# Patient Record
Sex: FEMALE | Employment: PART TIME | URBAN - METROPOLITAN AREA
[De-identification: names, ages, dates, MRNs, and addresses within clinical notes are randomized per-mention and may not be internally consistent; named-entity substitution may affect disease eponyms.]

---

## 2017-01-11 ENCOUNTER — HOSPITAL ENCOUNTER (OUTPATIENT)
Dept: GENERAL RADIOLOGY | Facility: CLINIC | Age: 22
Discharge: HOME | End: 2017-01-11
Attending: FAMILY MEDICINE | Admitting: FAMILY MEDICINE
Payer: COMMERCIAL

## 2017-01-11 ENCOUNTER — OFFICE VISIT (OUTPATIENT)
Dept: FAMILY MEDICINE | Facility: CLINIC | Age: 22
End: 2017-01-11
Payer: COMMERCIAL

## 2017-01-11 VITALS
OXYGEN SATURATION: 100 % | DIASTOLIC BLOOD PRESSURE: 76 MMHG | HEIGHT: 62 IN | WEIGHT: 189.75 LBS | BODY MASS INDEX: 34.92 KG/M2 | SYSTOLIC BLOOD PRESSURE: 124 MMHG | TEMPERATURE: 98.7 F | HEART RATE: 120 BPM

## 2017-01-11 DIAGNOSIS — Z23 NEED FOR VACCINATION AGAINST HUMAN PAPILLOMAVIRUS: ICD-10-CM

## 2017-01-11 DIAGNOSIS — Z13.6 CARDIOVASCULAR SCREENING; LDL GOAL LESS THAN 160: ICD-10-CM

## 2017-01-11 DIAGNOSIS — R11.15 INTRACTABLE CYCLICAL VOMITING WITH NAUSEA: ICD-10-CM

## 2017-01-11 DIAGNOSIS — Z30.41 ORAL CONTRACEPTIVE PILL SURVEILLANCE: ICD-10-CM

## 2017-01-11 DIAGNOSIS — R10.84 ABDOMINAL PAIN, GENERALIZED: ICD-10-CM

## 2017-01-11 DIAGNOSIS — Z00.01 ENCOUNTER FOR ROUTINE ADULT HEALTH EXAMINATION WITH ABNORMAL FINDINGS: Primary | ICD-10-CM

## 2017-01-11 DIAGNOSIS — Z12.4 SCREENING FOR MALIGNANT NEOPLASM OF CERVIX: ICD-10-CM

## 2017-01-11 LAB
CHOLEST SERPL-MCNC: 165 MG/DL
HDLC SERPL-MCNC: 47 MG/DL
LDLC SERPL CALC-MCNC: 97 MG/DL
NONHDLC SERPL-MCNC: 118 MG/DL
TRIGL SERPL-MCNC: 107 MG/DL
TSH SERPL DL<=0.005 MIU/L-ACNC: 2.02 MU/L (ref 0.4–4)

## 2017-01-11 PROCEDURE — 87491 CHLMYD TRACH DNA AMP PROBE: CPT | Performed by: FAMILY MEDICINE

## 2017-01-11 PROCEDURE — 87591 N.GONORRHOEAE DNA AMP PROB: CPT | Performed by: FAMILY MEDICINE

## 2017-01-11 PROCEDURE — 99395 PREV VISIT EST AGE 18-39: CPT | Mod: 25 | Performed by: FAMILY MEDICINE

## 2017-01-11 PROCEDURE — 90471 IMMUNIZATION ADMIN: CPT | Performed by: FAMILY MEDICINE

## 2017-01-11 PROCEDURE — 90649 4VHPV VACCINE 3 DOSE IM: CPT | Performed by: FAMILY MEDICINE

## 2017-01-11 PROCEDURE — 83516 IMMUNOASSAY NONANTIBODY: CPT | Performed by: FAMILY MEDICINE

## 2017-01-11 PROCEDURE — 84443 ASSAY THYROID STIM HORMONE: CPT | Performed by: FAMILY MEDICINE

## 2017-01-11 PROCEDURE — 80061 LIPID PANEL: CPT | Performed by: FAMILY MEDICINE

## 2017-01-11 PROCEDURE — 99213 OFFICE O/P EST LOW 20 MIN: CPT | Mod: 25 | Performed by: FAMILY MEDICINE

## 2017-01-11 PROCEDURE — G0145 SCR C/V CYTO,THINLAYER,RESCR: HCPCS | Performed by: FAMILY MEDICINE

## 2017-01-11 PROCEDURE — 36415 COLL VENOUS BLD VENIPUNCTURE: CPT | Performed by: FAMILY MEDICINE

## 2017-01-11 PROCEDURE — 74020 XR ABDOMEN 2 VW: CPT | Mod: TC

## 2017-01-11 RX ORDER — LEVONORGESTREL/ETHIN.ESTRADIOL 0.1-0.02MG
1 TABLET ORAL DAILY
Qty: 84 TABLET | Refills: 3 | Status: SHIPPED | OUTPATIENT
Start: 2017-01-11 | End: 2018-12-27

## 2017-01-11 ASSESSMENT — PAIN SCALES - GENERAL: PAINLEVEL: SEVERE PAIN (7)

## 2017-01-11 NOTE — NURSING NOTE
"Chief Complaint   Patient presents with     Abdominal Pain     Vomiting     After Eating     Gyn Exam       Initial /76 mmHg  Pulse 120  Temp(Src) 98.7  F (37.1  C) (Tympanic)  Ht 5' 2\" (1.575 m)  Wt 189 lb 12 oz (86.07 kg)  BMI 34.70 kg/m2  SpO2 100%  LMP 12/26/2016 (Approximate)  Breastfeeding? No Estimated body mass index is 34.7 kg/(m^2) as calculated from the following:    Height as of this encounter: 5' 2\" (1.575 m).    Weight as of this encounter: 189 lb 12 oz (86.07 kg).  BP completed using cuff size: quinton Gresham MA  "

## 2017-01-11 NOTE — Clinical Note
[address] [address]  [phone]      January 16, 2017    Susan Delarosa  [facility] [place] [address]          Dear Susan,    I am happy to inform you that your recent cervical cancer screening test (PAP smear) was normal.      Preventative screening such as this helps insure your health for years to come.  This test should be repeated in 3 years unless otherwise directed.    You will still need to return to the clinic every year for your annual exam and other preventive tests.    Please contact the clinic if you have further questions.      Sincerely,    Mercedes Jewell MD/mark

## 2017-01-11 NOTE — PROGRESS NOTES
SUBJECTIVE:                                                    S:  Susan Delarosa is a 21 year old female who presents to the clinic for a routine physical exam, but would also like to discuss vomiting and abdominal pain. Patient complains of nausea and emesis intermittently for about 1 year. She was seen in the ED in the summer of 2016 with nausea and vomiting, at which time she had completely normal labs and a negative Gallbladder U/S. She was given a GI cocktail at that time which she found helpful, she was discharged with a prescription for Omeprazole. Patient was seen in the clinic on 8/22/2016 complaining of persistent nausea and vomiting. She states at that time that her symptoms had somewhat improved with Omeprazole. I started her on Omeprazole daily and placed a referral for a GI specialist. She had a normal endoscopy on 8/31/2016. Today, patient states that she was just seen again on 1/10/17 in the [facility] ED with emesis for 5-6 days. She had normal labs including comprehensive panel, ESR and CBC, urine test including pregnancy test. She was given Phenergan with minimal relief. Patient was told to follow up with a GI specialist. She now complains of ongoing emesis for about a week, accompanied by nausea, abdominal pain, diarrhea, headaches, weakness, and lightheadedness. She says that she has been unable to eat anything in 5-6 days, and unable to hydrate with even clear liquids for 2-3 days. She states that she cannot keep anything down. Patient reports that she collapsed from weakness because of her emesis a couple days ago. She is curious if there is anything she can do for further evaluation.     Problem list and histories reviewed & adjusted, as indicated.  Additional history: as documented    Patient Active Problem List   Diagnosis     Intractable migraine with aura     Simple chronic serous otitis media     Acne vulgaris     Past Surgical History   Procedure Laterality Date     Hc  "create eardrum opening,gen anesth  1997     P.E. Tubes lt     Hc create eardrum opening,gen anesth  04/11/2005     Left myringotomy with PE tube placement.     Esophagoscopy, gastroscopy, duodenoscopy (egd), combined N/A 8/31/2016     Procedure: COMBINED ESOPHAGOSCOPY, GASTROSCOPY, DUODENOSCOPY (EGD), BIOPSY SINGLE OR MULTIPLE;  Surgeon: Jacob Gonzales MD;  Location:  GI       Social History   Substance Use Topics     Smoking status: Never Smoker      Smokeless tobacco: Never Used     Alcohol Use: No     Family History   Problem Relation Age of Onset     DIABETES Maternal Grandmother      DIABETES Maternal Grandfather      Neurologic Disorder Father      Migraines     Neurologic Disorder Paternal Grandmother      Migraines     CANCER No family hx of      HEART DISEASE No family hx of          Current Outpatient Prescriptions   Medication Sig Dispense Refill     levonorgestrel-ethinyl estradiol (AVIANE,ALESSE,LESSINA) 0.1-20 MG-MCG per tablet Take 1 tablet by mouth daily 84 tablet 3     [DISCONTINUED] levonorgestrel-ethinyl estradiol (AVIANE,ALESSE,LESSINA) 0.1-20 MG-MCG per tablet Take 1 tablet by mouth daily       Allergies   Allergen Reactions     Latex Hives     band-aids     ROS:  All other ROS reviewed and are negative or non-contributory except as stated in HPI or separate note.     OBJECTIVE:                                                    /76 mmHg  Pulse 120  Temp(Src) 98.7  F (37.1  C) (Tympanic)  Ht 5' 2\" (1.575 m)  Wt 189 lb 12 oz (86.07 kg)  BMI 34.70 kg/m2  SpO2 100%  LMP 12/26/2016 (Approximate)  Breastfeeding? No  Body mass index is 34.7 kg/(m^2).   .HEENT:  Normocephalic, non-tender. Eyes PERRLA, EOMI.  Ears TM's are pearly.  Nose patent. Throat without erythema.         NECK:  Supple with no adenopathy or thyromegaly.  No bruits.       LUNGS:  Clear to auscultation.     HEART:  Regular rhythm and rate, normal S1, S2, without murmur, rub, click, or gallop.     ABDOMEN:  Bowel " sounds are present throughout.  The abdomen is soft without hepatosplenomegaly or masses. Abdomen is extremely tender to touch throughout, with voluntary guarding.  Dark, course hair noted on lower abdomen.     NEURO:  CNII - XII grossly intact and Strength and sensation grossly intact     SKIN: Brown, patchy, macular hyperpigmentation on right crease of neck.     MSK:  FROM upper and lower extremities.      BREAST EXAM: No skin changes.  No nipple retraction or discharge.  No adenopathy palpated.  No distinct masses or nodules are palpable.    PELVIC EXAM:  EG negative for lesion or foreign body.  Narrow introitus, able to complete exam with narrow speculum.  Vaginal mucosa is moist and pink without discharge.  Cervix is normal without lesions.   Pap smear and and Gen Probe obtained without incident.  Bimanual exam reveals: firm, nontender, nongravid uterus without CMT.  Adenexa are mobile and non-tender bilaterally.    Rectovaginal exam deferred.      Diagnostic Test Results:    Orders Placed This Encounter   Procedures     XR Abdomen 2 Views     TSH with free T4 reflex     Tissue transglutaminase nick IgA and IgG        ASSESSMENT:                                                      Abdominal pain, generalized  Intractable cyclical vomiting with nausea    PLAN:                                                      Patient is afebrile with normal vitals upon presentation to the clinic. On exam, she is exquisitely tender with guarding in general throughout the abdomen. She had several normal labs and imaging when having her symptoms evaluated previously, as above. Discussed that her weight is stable, and in fact, is going up, so I am less concerned about serious illness or dehydration, although by her description she sound dehydrated or at least at risk of becoming so. Abdominal X-ray ordered, which shows a normal stool and gas pattern, and is otherwise normal without obstruction or free air. Labs drawn as well, TSH  is normal. She requested this due to difficulty losing weight. She also requested to be tested for gluten allergy, so I agreed to draw an tissue Transglutaminase ab.  Will notify with results and discuss further evaluation if needed at that time. Will also schedule an abdominal U/S and notify with results. If U/S is normal, I will refer her to a GI specialist to consider another endoscopy or GI motility study. Encouraged her to rest and stay well hydrated. She should follow a clear liquid diet in the mean time, which we discussed at length. Patient is in agreement with this treatment plan and stable. Follow up to discuss results, or sooner with difficulty staying hydrated, high fever, or other worsening symptoms.     This document serves as a record of services personally performed by Mercedes Jewell MD. It was created on their behalf by Yokasta Parra, a trained medical scribe. The creation of this record is based on the provider's personal observations and the statements of the patient. This document has been checked and approved by the attending provider.    Mercedes Jewell MD  [facility]

## 2017-01-11 NOTE — PROGRESS NOTES
SUBJECTIVE:     CC: Susan Delarosa is an 21 year old woman who presents for preventive health visit.     Last pap: No results found for this basename: pap, never had one yet  Last Tetanus:  6/8/2016  Last Dexa N/A   Last Colonoscopy N/A  Do you take a Calcium supplement No  Do you exercise Yes  Do you wear a seatbelt Yes  Last Mammogram N/A  Do you perform your own monthly self breast exam?  No  Do you use sunscreen? Yes  Periods- Regular - using OCP's and condoms.     Healthy Habits:    Do you get at least three servings of calcium containing foods daily (dairy, green leafy vegetables, etc.)? yes    Problems taking medications regularly No    Medication side effects: No    Have you had an eye exam in the past two years? yes    Do you see a dentist twice per year? yes      Other concerns to address:  See separate note re: abdominal pain and vomiting.     Today's PHQ-2 Score:      Abuse: Current or Past(Physical, Sexual or Emotional)- No  Do you feel safe in your environment - Yes    Social History   Substance Use Topics     Smoking status: Never Smoker      Smokeless tobacco: Never Used     Alcohol Use: No     The patient does not drink >3 drinks per day nor >7 drinks per week.    Reviewed orders with patient.  Reviewed health maintenance and updated orders accordingly - Yes    History of abnormal Pap smear: NO - age 21-29 PAP every 3 years recommended  All Histories reviewed and updated in Epic.    Patient Active Problem List    Diagnosis Date Noted     Acne vulgaris 04/11/2012     Simple chronic serous otitis media 01/14/2005     Problem list name updated by automated process. Provider to review       Intractable migraine with aura 12/17/2004     Problem list name updated by automated process. Provider to review          Past Medical History   Diagnosis Date     Headache(784.0)      Unspecified chronic suppurative otitis media      Recurrent left otitis     Acne         Past Surgical History   Procedure  "Laterality Date     Hc create eardrum opening,gen anesth  1997     P.E. Tubes lt     Hc create eardrum opening,gen anesth  04/11/2005     Left myringotomy with PE tube placement.     Esophagoscopy, gastroscopy, duodenoscopy (egd), combined N/A 8/31/2016     Procedure: COMBINED ESOPHAGOSCOPY, GASTROSCOPY, DUODENOSCOPY (EGD), BIOPSY SINGLE OR MULTIPLE;  Surgeon: Jacob Gonzales MD;  Location:  GI        Family History   Problem Relation Age of Onset     DIABETES Maternal Grandmother      DIABETES Maternal Grandfather      Neurologic Disorder Father      Migraines     Neurologic Disorder Paternal Grandmother      Migraines     CANCER No family hx of      HEART DISEASE No family hx of           reports that she currently engages in sexual activity and has had male partners. She reports using the following methods of birth control/protection: Pill and Condom.     REVIEW OF SYSTEMS:   Ears/Nose/Throat: negative  Dental exam: negative    Eyes: negative.  Last eye exam: UTD.  Correction: glasses/contacts  Respiratory: negative  Cardiovascular: negative  Gastrointestinal: Positive for abdominal pain, emesis, vomiting, see separate note.   Genitourinary: negative  Musculoskeletal: negative    Neurologic: negative   Skin: negative   Psychiatric: negative   Hematologic/Lymphatic/Immunologic:  negative    Endocrine:  negative   GYN: Periods as above.  No concerns for STD's. 1 partner.        OBJECTIVE:       /76 mmHg  Pulse 120  Temp(Src) 98.7  F (37.1  C) (Tympanic)  Ht 5' 2\" (1.575 m)  Wt 189 lb 12 oz (86.07 kg)  BMI 34.70 kg/m2  SpO2 100%  LMP 12/26/2016 (Approximate)  Breastfeeding? No    HEENT:  Normocephalic, non-tender. E yes PERRLA, EOMI.  Ears TM's are pearly.  Nose patent. Throat without erythema.         NECK:  Supple with no adenopathy or thyromegaly.  No bruits.       LUNGS:  Clear to auscultation.     HEART:  Regular rhythm and rate, normal S1, S2, without murmur, rub, click, or gallop. "     ABDOMEN:  Bowel sounds are present throughout. The abdomen is soft without hepatosplenomegaly or masses. Abdomen is extremely tender to touch throughout, with voluntary guarding. Dark, course hair noted on lower abdomen.    NEURO:  CNII - XII grossly intact and Strength and sensation grossly intact     SKIN: Brown, patchy, macular hyperpigmentation on right crease of neck.     MSK:  FROM upper and lower extremities.      BREAST EXAM: No skin changes.  No nipple retraction or discharge.  No adenopathy palpated.  No distinct masses or nodules are palpable.    PELVIC EXAM:  EG negative for lesion or foreign body.  Narrow introitus allows narrow speculum.  Vaginal mucosa is moist and pink without discharge.  Cervix is normal without lesions.   Pap smear and and Gen Probe obtained without incident.  Bimanual exam reveals: firm, nontender, nongravid uterus without CMT.  Adenexa are mobile and non-tender bilaterally.    Rectovaginal exam deferred.      ATP III Guidelines  FRAX Risk Assessment  ICSI Preventive Guidelines    ASSESSMENT/PLAN:         ICD-10-CM    1. Encounter for routine adult health examination with abnormal findings Z00.01 Chlamydia trachomatis PCR     Neisseria gonorrhoeae PCR   2. Screening for malignant neoplasm of cervix Z12.4 Pap imaged thin layer screen only - recommended age 21 - 24 years   3. Need for vaccination against human papillomavirus Z23 HUMAN PAPILLOMA VIRUS VACCINE   4. Abdominal pain, generalized R10.84 XR Abdomen 2 Views     TSH with free T4 reflex     Tissue transglutaminase nick IgA and IgG   5. Intractable cyclical vomiting with nausea G43.A1 XR Abdomen 2 Views     TSH with free T4 reflex     Tissue transglutaminase nick IgA and IgG   6. Oral contraceptive pill surveillance Z30.41 levonorgestrel-ethinyl estradiol (AVIANE,ALESSE,LESSINA) 0.1-20 MG-MCG per tablet   7. CARDIOVASCULAR SCREENING; LDL GOAL LESS THAN 160 Z13.6 Lipid Profile with reflex to direct LDL       PLAN:  -  Recommend yearly physical with pap every 3 years - PAP and Gen Probe collected, will notify with results and discuss further evaluation/ treatment if needed.   - Labs drawn as above, will notify with results and discuss further evaluation/ treatment if needed at that time.   - Discussed Nexaplanon and other forms of contraception, patient would like to continue with OCP's. Refilled contraceptives.   - Exercise encouraged minimum 30 minutes daily  - Monitor size and characteristics of moles, f/u with any changes  - HPV Vaccination #2 given.   - 3rd HEP B vaccination due after 2/2.   - See separate note for further plan.     COUNSELING:  Reviewed preventive health counseling, as reflected in patient instructions       Regular exercise       Healthy diet/nutrition       Vision screening       Hearing screening       Vaccinated for: Human Papillomavirus       Contraception   reports that she has never smoked. She has never used smokeless tobacco.    Body mass index is 34.7 kg/(m^2).  Weight management plan: Discussed healthy diet and exercise guidelines and patient will follow up in 12 months in clinic to re-evaluate.    This document serves as a record of services personally performed by Mercedes Jewell MD. It was created on their behalf by Yokasta Parra, a trained medical scribe. The creation of this record is based on the provider's personal observations and the statements of the patient. This document has been checked and approved by the attending provider.    Mercedes Jewell MD

## 2017-01-12 ENCOUNTER — TELEPHONE (OUTPATIENT)
Dept: FAMILY MEDICINE | Facility: CLINIC | Age: 22
End: 2017-01-12

## 2017-01-12 DIAGNOSIS — R10.84 ABDOMINAL PAIN, GENERALIZED: Primary | ICD-10-CM

## 2017-01-12 LAB
C TRACH DNA SPEC QL NAA+PROBE: NORMAL
N GONORRHOEA DNA SPEC QL NAA+PROBE: NORMAL
SPECIMEN SOURCE: NORMAL
SPECIMEN SOURCE: NORMAL

## 2017-01-12 NOTE — TELEPHONE ENCOUNTER
Susan wondering if she can get a work excuse note for her job.  Seen in clinic 1/11/17, symptoms continue.  She is unable to keep any thing down, anti nausea med she received in ED (Phenergan) is making nausea worse.  She is too weak to work, is requiring assistance with ADL's due to her weakness.  If possible, could a note be faxed to her employer ([employer]), to the employee occupational health dept.  Or, if she needs to, she can come to clinic to pick it up.    Thanks  Viviane Smalls RN  FNA

## 2017-01-12 NOTE — Clinical Note
[address] [address]  Phone: [phone]  Fax: [phone]    January 13, 2017        Susan Delarosa  [address] [place] [address] [address] [place] [address]          To whom it may concern:    RE: Susan Delarosa    Patient was seen and treated 1/11/17 at our clinic and missed work. Due to illness, I am excusing her from work through 1/17/17. She should be able to work without restrictions as of 1/20/17.      Please contact me for questions or concerns.      Sincerely,        Mercedes Jewell MD

## 2017-01-13 ENCOUNTER — TELEPHONE (OUTPATIENT)
Dept: FAMILY MEDICINE | Facility: CLINIC | Age: 22
End: 2017-01-13

## 2017-01-13 NOTE — TELEPHONE ENCOUNTER
"----- Message from Mercedes Jewell MD sent at 1/13/2017  3:29 PM CST -----  Notify Susan that her STD tests are normal, no infection. Her thyroid is good. Her cholesterol is good except she needs to raise her \"good\" HDL cholesterol through more exercise.   Mercedes Jewell MD  "

## 2017-01-13 NOTE — PROGRESS NOTES
"Quick Note:    Notify Susan that her STD tests are normal, no infection. Her thyroid is good. Her cholesterol is good except she needs to raise her \"good\" HDL cholesterol through more exercise.   Mercedes Jewell MD  ______  "

## 2017-01-13 NOTE — TELEPHONE ENCOUNTER
See letter.  Also please make sure she is getting set up for upper abdominal u/s. I'm still waiting on some of her labs, but want to proceed with upper abdominal u/s.   Mercedes Jewell MD

## 2017-01-13 NOTE — TELEPHONE ENCOUNTER
Susan is calling this morning wondering if she can get a note for work. Still very weak. Please call when available

## 2017-01-13 NOTE — TELEPHONE ENCOUNTER
Patient called in regarding the Ultrasound for her abdomen. I didn't see an order for an ultrasound. Please give patient a call at [phone]

## 2017-01-13 NOTE — TELEPHONE ENCOUNTER
Informed patient about letter being faxed did not remember to tell patient about the Ultrasound for her abdomen.    Need to call Monday re: that please.   Viviane POLANCO

## 2017-01-13 NOTE — TELEPHONE ENCOUNTER
Susan is calling back stating the dates to be excused are 01/16/17 and 01/17/17. Patient would like to return to work on 01/20/17. Please fax to [phone]

## 2017-01-13 NOTE — TELEPHONE ENCOUNTER
JASON on VM for patient to return call to clinic.  We need to know how long she wants to be excused from work (dates) and the fax number of where she wants it faxed to.  Melony Gresham MA

## 2017-01-14 LAB
COPATH REPORT: NORMAL
PAP: NORMAL

## 2017-01-17 LAB
TTG IGA SER-ACNC: 1 U/ML
TTG IGG SER-ACNC: NORMAL U/ML

## 2017-01-19 NOTE — TELEPHONE ENCOUNTER
Called patient and she is still having nausea and vomiting(after eating). Patient has not received Gluten lab results yet.  Jennifer PIRES MA

## 2017-01-19 NOTE — TELEPHONE ENCOUNTER
Patient would like a call back to update you on her condition, also states that she is missing some of the labs results that were done. Please call and advise #[phone] would like a call later today or early tomorrow

## 2017-01-20 NOTE — TELEPHONE ENCOUNTER
Pt called stating she is still having nausea and vomiting. Pt is wondering if she could speak with Dr. Jewell before the end of today. Please advise.    Thank you,   Brittany Laird   for [facility]

## 2017-01-20 NOTE — TELEPHONE ENCOUNTER
Patient is not able to work with this and needs note for work from 1/6 -?. Patient US is on thurs 1/26. Patient will trial gluten free diet. Informed patient that we could try to get US scheduled sooner and she stated she will keep appointment and try gluten free diet at this time.  Jennifer PIRES MA

## 2017-01-20 NOTE — TELEPHONE ENCOUNTER
Notify her that the test came back negative, she is NOT allergic to gluten.  She still may be sensitive to it, and still would likely benefit from a gluten free diet trial for the next 2 months. Also please find out if she is getting the upper abdominal us that I recommended.   Mercedes Jewell MD

## 2017-01-23 NOTE — TELEPHONE ENCOUNTER
JASON on VM for patient to call back with date she is wanting to go back,  who it should be written out to, and where she wants it sent.  Melony Gresham MA

## 2017-01-23 NOTE — TELEPHONE ENCOUNTER
When she calls back please gather the below information and set up a letter for Dr. Jewell to sign.  Thanks  Melony Gresham MA

## 2017-01-24 ENCOUNTER — TELEPHONE (OUTPATIENT)
Dept: FAMILY MEDICINE | Facility: CLINIC | Age: 22
End: 2017-01-24

## 2017-01-24 NOTE — TELEPHONE ENCOUNTER
Reason for Call:  Other     Detailed comments: Asking for a note for work stating how long she might be out for. She continues to have stomach symptoms and is nauseated before and after she eats. Informed her Dr. Jewell is out of office until 1/30 and this might need to wait for her. She is asking if another care team provider would be able to help her.     Phone Number Patient can be reached at: Cell number on file:     Telephone Information:    Mobile  [phone]        Best Time: any     Can we leave a detailed message on this number? YES    Call taken on 1/24/2017 at 3:03 PM by Ara Davis

## 2017-01-24 NOTE — TELEPHONE ENCOUNTER
Reason for Call:  Other     Detailed comments: Asking for a note for work stating how long she might be out for. She continues to have stomach symptoms and is nauseated before and after she eats. Informed her Dr. Jewell is out of office until 1/30 and this might need to wait for her. She is asking if another care team provider would be able to help her.     Phone Number Patient can be reached at: Cell number on file:    Telephone Information:   Mobile [phone]       Best Time: any     Can we leave a detailed message on this number? YES    Call taken on 1/24/2017 at 3:03 PM by Ara Davis

## 2017-01-26 ENCOUNTER — HOSPITAL ENCOUNTER (OUTPATIENT)
Dept: ULTRASOUND IMAGING | Facility: CLINIC | Age: 22
Discharge: HOME | End: 2017-01-26
Attending: FAMILY MEDICINE | Admitting: FAMILY MEDICINE
Payer: COMMERCIAL

## 2017-01-26 DIAGNOSIS — R10.84 ABDOMINAL PAIN, GENERALIZED: ICD-10-CM

## 2017-01-26 PROCEDURE — 76700 US EXAM ABDOM COMPLETE: CPT

## 2017-01-31 NOTE — TELEPHONE ENCOUNTER
Message left on patient phone with what Dr. Jewell instructed and that was to see GI for consult re: abdominal pain.  She did not mention anything about a note or that she was willing to write a letter for her.   Viviane POLANCO

## 2017-03-21 ENCOUNTER — TRANSFERRED RECORDS (OUTPATIENT)
Dept: HEALTH INFORMATION MANAGEMENT | Facility: CLINIC | Age: 22
End: 2017-03-21

## 2017-03-31 ENCOUNTER — TRANSFERRED RECORDS (OUTPATIENT)
Dept: HEALTH INFORMATION MANAGEMENT | Facility: CLINIC | Age: 22
End: 2017-03-31

## 2017-04-03 ENCOUNTER — TELEPHONE (OUTPATIENT)
Dept: FAMILY MEDICINE | Facility: CLINIC | Age: 22
End: 2017-04-03

## 2017-04-03 NOTE — TELEPHONE ENCOUNTER
Reason for Call:  Request for results:    Name of test or procedure: ct scan    Date of test of procedure: 03/29/17    Location of the test or procedure: [facility]     OK to leave the result message on voice mail or with a family member? YES    Phone number Patient can be reached at:  Home number on file [phone] (home)    Additional comments: patient states that [facility] sent over the results of her CT scan and patient would like to know what the next step it. Please advise    Call taken on 4/3/2017 at 2:06 PM by Vicki Hurt

## 2017-04-14 NOTE — TELEPHONE ENCOUNTER
stated patient should be following up with GI. I called patient and she is going to return call with them to schedule a MRI of the liver and pelvic US for ovarian cyst.  will watch for results via fax  Jennifer PIRES MA

## 2017-09-26 ENCOUNTER — TRANSFERRED RECORDS (OUTPATIENT)
Dept: HEALTH INFORMATION MANAGEMENT | Facility: CLINIC | Age: 22
End: 2017-09-26

## 2017-11-11 ENCOUNTER — TRANSFERRED RECORDS (OUTPATIENT)
Dept: HEALTH INFORMATION MANAGEMENT | Facility: CLINIC | Age: 22
End: 2017-11-11

## 2018-07-10 ENCOUNTER — TELEPHONE (OUTPATIENT)
Dept: FAMILY MEDICINE | Facility: CLINIC | Age: 23
End: 2018-07-10

## 2018-07-10 NOTE — TELEPHONE ENCOUNTER
Reason for Call:  Same Day Appointment, Requested Provider:  Mercedes Jewell M.D.    PCP: Mercedes Jewell    Reason for visit: Patient needs to have an ED FU appt in the next 2 days with PCP for an arm injury due to a fall and possible rib fracture.     Duration of symptoms: today    Have you been treated for this in the past? Yes    Additional comments:     Can we leave a detailed message on this number? YES    Phone number patient can be reached at: Home number on file [phone] (home)    Best Time: any    Call taken on 7/10/2018 at 10:44 AM by Betsy Paredes

## 2018-07-10 NOTE — TELEPHONE ENCOUNTER
Per Mercedes Jewell MD patient can be seen today 7/10 at either 2:00pm or 4:00pm. Patient can be seen tomorrow 7/11 at 2:30pm.  Contacted patient to set up appointment. LM for patient to call [phone]  Katharine Nation CMA

## 2018-07-11 ENCOUNTER — OFFICE VISIT (OUTPATIENT)
Dept: FAMILY MEDICINE | Facility: CLINIC | Age: 23
End: 2018-07-11

## 2018-07-11 VITALS
DIASTOLIC BLOOD PRESSURE: 76 MMHG | WEIGHT: 214.4 LBS | HEART RATE: 90 BPM | OXYGEN SATURATION: 100 % | BODY MASS INDEX: 39.21 KG/M2 | SYSTOLIC BLOOD PRESSURE: 108 MMHG | TEMPERATURE: 98.7 F

## 2018-07-11 DIAGNOSIS — M25.511 ACUTE PAIN OF RIGHT SHOULDER: Primary | ICD-10-CM

## 2018-07-11 DIAGNOSIS — R07.81 RIB PAIN ON RIGHT SIDE: ICD-10-CM

## 2018-07-11 DIAGNOSIS — M79.621 PAIN OF RIGHT UPPER ARM: ICD-10-CM

## 2018-07-11 PROCEDURE — 99214 OFFICE O/P EST MOD 30 MIN: CPT | Performed by: FAMILY MEDICINE

## 2018-07-11 RX ORDER — HYDROCODONE BITARTRATE AND ACETAMINOPHEN 5; 325 MG/1; MG/1
1 TABLET ORAL EVERY 6 HOURS PRN
Qty: 20 TABLET | Refills: 0 | Status: SHIPPED | OUTPATIENT
Start: 2018-07-11 | End: 2019-04-01

## 2018-07-11 RX ORDER — IBUPROFEN 600 MG/1
600 TABLET, FILM COATED ORAL EVERY 6 HOURS PRN
Qty: 90 TABLET | Refills: 1 | Status: SHIPPED | OUTPATIENT
Start: 2018-07-11

## 2018-07-11 RX ORDER — HYDROCODONE BITARTRATE AND ACETAMINOPHEN 5; 325 MG/1; MG/1
TABLET ORAL
COMMUNITY
Start: 2018-07-10 | End: 2018-07-11

## 2018-07-11 ASSESSMENT — PAIN SCALES - GENERAL: PAINLEVEL: EXTREME PAIN (8)

## 2018-07-11 NOTE — LETTER
[address] [address]  Phone: [phone]  Fax: [phone]    July 11, 2018        Susan Delarosa  [address] [place] [address] [address] [place] [address]          To whom it may concern:    RE: Susan Delarosa    Patient was seen and treated today at our clinic. She is restricted from work for the next week and will be re-evaluated in 1 week.     Please contact me for questions or concerns.        Sincerely,        Mercedes Jewell MD

## 2018-07-11 NOTE — PATIENT INSTRUCTIONS
Fortunately I'm not seeing any broken bones on exam or on review of your ER records.    Unfortunately, even if things are not broken, they can be pretty painful.     Make sure you work on your deep breathing, take 10 full deep slow breaths every hour while you're awake.      I am going to have you take the following medications for the next few days.     Take 1 vicodin along with an extra strength (500 mg) tylenol.  Take 1 of each, at the same time, every 6 hours.     Also, take 600 mg of Ibuprofen every 6 hours also, but alternate them 3 hours apart from the Tylenol/vicodin combination.     Hopefully after the next few days you won't need the vicodin any longer and just take the extra strength tylenol.     I'll see you back in 1 week, no work until then.   Mercedes Jewell MD

## 2018-07-11 NOTE — LETTER
[address] [address]  Phone: [phone]  Fax: [phone]    July 11, 2018      Re:    Paloma Dowell  [facility] [facility] [address] [facility] [address] [place] [address]          To whom it may concern:    Patient was seen and treated today at our clinic. She is restricted from work for the next week and will be re-evaluated in 1 week.     Please contact me for questions or concerns.        Sincerely,        Mercedes Jewell MD

## 2018-07-11 NOTE — MR AVS SNAPSHOT
After Visit Summary   7/11/2018    Susan Delarosa    MRN: [id number]           Patient Information     Date Of Birth          1995        Visit Information        Provider Department      7/11/2018 2:30 PM Mercedes Jewell MD [facility]        Today's Diagnoses     Acute pain of right shoulder    -  1    Rib pain on right side          Care Instructions    Fortunately I'm not seeing any broken bones on exam or on review of your ER records.    Unfortunately, even if things are not broken, they can be pretty painful.     Make sure you work on your deep breathing, take 10 full deep slow breaths every hour while you're awake.      I am going to have you take the following medications for the next few days.     Take 1 vicodin along with an extra strength (500 mg) tylenol.  Take 1 of each, at the same time, every 6 hours.     Also, take 600 mg of Ibuprofen every 6 hours also, but alternate them 3 hours apart from the Tylenol/vicodin combination.     Hopefully after the next few days you won't need the vicodin any longer and just take the extra strength tylenol.     I'll see you back in 1 week, no work until then.   Mercedes Jewell MD           Follow-ups after your visit        Your next 10 appointments already scheduled     Jul 18, 2018  9:30 AM CDT   SHORT with Mercedes Jewell MD   [facility] ([facility])    [address] [place] [address] [phone]              Who to contact     If you have questions or need follow up information about today's clinic visit or your schedule please contact [facility] directly at [phone].  Normal or non-critical lab and imaging results will be communicated to you by MyChart, letter or phone within 4 business days after the clinic has received the results. If you do not hear from us within 7 days, please contact the clinic through MyChart or phone.  If you have a critical or abnormal lab result, we will notify you by phone as soon as possible.  Submit refill requests through [employer] or call your pharmacy and they will forward the refill request to us. Please allow 3 business days for your refill to be completed.          Additional Information About Your Visit        Care EveryWhere ID     This is your Care EveryWhere ID. This could be used by other organizations to access your [place] medical records  [id number]        Your Vitals Were     Pulse Temperature Last Period Pulse Oximetry BMI (Body Mass Index)       90 98.7  F (37.1  C) (Temporal) 06/17/2018 (Approximate) 100% 39.21 kg/m2        Blood Pressure from Last 3 Encounters:   07/11/18 108/76   01/11/17 124/76   11/14/16 102/78    Weight from Last 3 Encounters:   07/11/18 214 lb 6.4 oz (97.3 kg)   01/11/17 189 lb 12 oz (86.1 kg)   11/14/16 191 lb 12.8 oz (87 kg)              Today, you had the following     No orders found for display         Today's Medication Changes          These changes are accurate as of 7/11/18  3:19 PM.  If you have any questions, ask your nurse or doctor.               Start taking these medicines.        Dose/Directions    ibuprofen 600 MG tablet   Commonly known as:  ADVIL/MOTRIN   Used for:  Acute pain of right shoulder, Rib pain on right side   Started by:  Mercedes Jeewll MD        Dose:  600 mg   Take 1 tablet (600 mg) by mouth every 6 hours as needed for moderate pain   Quantity:  90 tablet   Refills:  1         These medicines have changed or have updated prescriptions.        Dose/Directions    HYDROcodone-acetaminophen 5-325 MG per tablet   Commonly known as:  NORCO   This may have changed:    - how much to take  - when to take this  - reasons to take this   Used for:  Acute pain of right shoulder, Rib pain on right side   Changed by:  Mercedes Jewell MD        Dose:  1 tablet   Take 1 tablet by mouth every 6 hours as needed for severe pain    Quantity:  20 tablet   Refills:  0            Where to get your medicines      These medications were sent to [facility]/pharmacy #[id number] - [place], [place] - [address]  [address] [facility] [address]     Phone:  [phone]     ibuprofen 600 MG tablet         Some of these will need a paper prescription and others can be bought over the counter.  Ask your nurse if you have questions.     Bring a paper prescription for each of these medications     HYDROcodone-acetaminophen 5-325 MG per tablet               Information about OPIOIDS     PRESCRIPTION OPIOIDS: WHAT YOU NEED TO KNOW   We gave you an opioid (narcotic) pain medicine. It is important to manage your pain, but opioids are not always the best choice. You should first try all the other options your care team gave you. Take this medicine for as short a time (and as few doses) as possible.     These medicines have risks:    DO NOT drive when on new or higher doses of pain medicine. These medicines can affect your alertness and reaction times, and you could be arrested for driving under the influence (DUI). If you need to use opioids long-term, talk to your care team about driving.    DO NOT operate heave machinery    DO NOT do any other dangerous activities while taking these medicines.     DO NOT drink any alcohol while taking these medicines.      If the opioid prescribed includes acetaminophen, DO NOT take with any other medicines that contain acetaminophen. Read all labels carefully. Look for the word  acetaminophen  or  Tylenol.  Ask your pharmacist if you have questions or are unsure.    You can get addicted to pain medicines, especially if you have a history of addiction (chemical, alcohol or substance dependence). Talk to your care team about ways to reduce this risk.    Store your pills in a secure place, locked if possible. We will not replace any lost or stolen medicine. If you don t finish your medicine, please throw away (dispose) as  directed by your pharmacist. The [place] Pollution Control Agency has more information about safe disposal: https://www.pca.Replaced by [facility].[place]/living-green/managing-unwanted-medications.     All opioids tend to cause constipation. Drink plenty of water and eat foods that have a lot of fiber, such as fruits, vegetables, prune juice, apple juice and high-fiber cereal. Take a laxative (Miralax, milk of magnesia, Colace, Senna) if you don t move your bowels at least every other day.          Primary Care Provider Office Phone # Fax #    Mercedes Sara Jewell [phone] [address] [place] [address]        Equal Access to Services     YUDI OREILLY : Supriya ruelas Sopauly, waaxda luqadaha, qaybta kaalmada adejanusz, mirela lopez. So [facility] [phone].    ATENCIÓN: Si habla español, tiene a andrews disposición servicios gratuitos de asistencia lingüística. Llame al [phone].    We comply with applicable federal civil rights laws and [place] laws. We do not discriminate on the basis of race, color, national origin, age, disability, sex, sexual orientation, or gender identity.            Thank you!     Thank you for choosing [facility]  for your care. Our goal is always to provide you with excellent care. Hearing back from our patients is one way we can continue to improve our services. Please take a few minutes to complete the written survey that you may receive in the mail after your visit with us. Thank you!             Your Updated Medication List - Protect others around you: Learn how to safely use, store and throw away your medicines at www.disposemymeds.org.          This list is accurate as of 7/11/18  3:19 PM.  Always use your most recent med list.                   Brand Name Dispense Instructions for use Diagnosis    HYDROcodone-acetaminophen 5-325 MG per tablet    NORCO    20 tablet    Take 1 tablet by mouth every 6 hours as  needed for severe pain    Acute pain of right shoulder, Rib pain on right side       ibuprofen 600 MG tablet    ADVIL/MOTRIN    90 tablet    Take 1 tablet (600 mg) by mouth every 6 hours as needed for moderate pain    Acute pain of right shoulder, Rib pain on right side       levonorgestrel-ethinyl estradiol 0.1-20 MG-MCG per tablet    AVIANE,ALESSE,LESSINA    84 tablet    Take 1 tablet by mouth daily    Oral contraceptive pill surveillance

## 2018-07-12 NOTE — PROGRESS NOTES
Visit Date:   07/11/2018      Patient KARRIE Hardin. She was recently  and has not yet changed her name in Epic.        SUBJECTIVE: She is here today for followup of recent ER visit.  Yesterday she fell down the stairs.  She just lost her footing and she was hanging onto the railing, ended up pulling her right arm behind her and slipping down about 10 stairs, landing on her right side.  She was seen in the emergency room at [facility] and had a thorough evaluation of her right side including her shoulder, her upper arm, her elbow, her chest and abdomen.  She complained of hearing and feeling a pop in her right shoulder and having pain all along the right side in the shoulder, upper arm, elbow, chest wall and upper abdomen.  She feels like she bruised or broke some ribs.  She has a hard time using her right arm at all and she has been wearing a sling since her ER visit.  She rates her pain at an 8/10.  She was given a small prescription for some Vicodin and she says it does not really help much.  They did x-rays on her arm, shoulder and humerus and CT scan of the abdomen and pelvis, and no fractures or lacerations were found.  She was told that she just bruised everything and needed to follow up in the clinic.  She denies hitting her head or having any loss of consciousness.  She denies any bleeding or visible bruising or deformities.  She works using her hands and states she is not able to use her right hand because she is right-hand dominant and she needs a letter to excuse her from work until I feel she can go back.  She is accompanied by her  today.      Please see Epic for past medical history.  Her medications and histories were all reviewed.  She is otherwise healthy.      OBJECTIVE:   VITAL SIGNS:  Noted.   GENERAL:  The patient is alert, oriented and in no acute distress.     HEENT: Head is atraumatic, normocephalic.   NECK:  Supple without lymphadenopathy.  No deformities  palpated.     MUSCULOSKELETAL: She is diffusely tender everywhere I palpate in the musculature and bony prominences on the right side from the shoulder, trapezius, all the way down through the humerus and into the elbow and forearm.  No specific pain with palpation in the wrist or hand.  She has full range of motion in both wrists and normal  strength.  Normal sensation.  Normal color.  No edema.  No visible bruising.  She is tender on both the medial and lateral epicondyle of the elbow.  She is tender over the olecranon process.  She is tender throughout the biceps, triceps and all along the anterior and posterior shoulder with palpation.  She is tender all along the lateral right rib cage with no crepitus or step-off deformities.   CV:  Regular rate and rhythm without murmur.   LUNGS:  Clear to auscultation bilaterally.   EXTREMITIES:  The only visible abnormality that I see in her is that her right shoulder is lower than her left.  I do not appreciate a dimple or a step-off deformity and her clavicles are intact without palpable fracture.  No subcutaneous emphysema.  I cannot tell if this is just her normal asymmetry or if this is a decrease in height of her right shoulder acutely due to injury.      ASSESSMENT:   1.  Acute pain in the right shoulder.   2.  Right rib pain.   3.  Right arm pain.      PLAN:  I reassured Paloma that I see no evidence for fracture. I do question a possible subluxation or dislocation of the right shoulder, but on x-ray it was normal.  I do not feel an AC separation, although she is tender diffusely throughout the shoulder, making it hard to examine.  We talked about the possibility of getting an MRI, but we opted to wait 1-2 weeks. I am going to see her back in the clinic next week and we will see if she is making any progress toward healing.  A lot of this may just be contusions and strains from the fall.  Since there is no visible bruising and she had a thorough evaluation in the  ER, I opted not to do any further imaging today.  I am going to have her take some ibuprofen and Tylenol and I did give her another small prescription for some Vicodin.  We will have her alternate these and ice as much as possible.  I am going to have her off work for another week until I can reevaluate her next week.  She will follow up sooner with any worsening.  I advised her to do deep breathing exercises 10 times every hour while she is awake to prevent atelectasis and pneumonia.  She has no further questions.         KAZ MOORE MD             D: 2018   T: 2018   MT:       Name:     EMMANUEL RENO   MRN:      [id number]        Account:      [id number]   :      1995           Visit Date:   2018      Document: [id number]

## 2018-07-18 ENCOUNTER — OFFICE VISIT (OUTPATIENT)
Dept: FAMILY MEDICINE | Facility: CLINIC | Age: 23
End: 2018-07-18

## 2018-07-18 VITALS
HEART RATE: 134 BPM | HEIGHT: 63 IN | TEMPERATURE: 97.9 F | OXYGEN SATURATION: 98 % | BODY MASS INDEX: 37.56 KG/M2 | WEIGHT: 212 LBS | SYSTOLIC BLOOD PRESSURE: 112 MMHG | DIASTOLIC BLOOD PRESSURE: 68 MMHG

## 2018-07-18 DIAGNOSIS — R07.81 RIB PAIN ON RIGHT SIDE: ICD-10-CM

## 2018-07-18 DIAGNOSIS — M25.511 ACUTE PAIN OF RIGHT SHOULDER: Primary | ICD-10-CM

## 2018-07-18 DIAGNOSIS — M79.621 PAIN OF RIGHT UPPER ARM: ICD-10-CM

## 2018-07-18 PROCEDURE — 99213 OFFICE O/P EST LOW 20 MIN: CPT | Performed by: FAMILY MEDICINE

## 2018-07-18 ASSESSMENT — PAIN SCALES - GENERAL: PAINLEVEL: EXTREME PAIN (8)

## 2018-07-18 NOTE — PROGRESS NOTES
Visit Date:   07/18/2018      SUBJECTIVE:  Susan is here to follow up on her right arm injury.  I saw her last week, and she has been wearing a sling since that visit and doing well.  She is not any better.  She still has a lot of pain in the right shoulder and the right rib cage.  She still feels very sore and has limited range of motion of the right arm.  Her mom has encouraged her to try to do range of motion, and she just cannot tolerate moving it.  Her breathing is fine.  Nothing worse.  She is still not working.  She has not seen any bruises develop or any other injuries.      OBJECTIVE:   VITAL SIGNS:  Vitals noted.   GENERAL:  The patient is alert, oriented and in no acute distress.     EXTREMITIES:  I removed her sling from the right arm.  Her right shoulder is held in an abducted position, and it seems to be a more forward position than the left side and slightly lower than the left side as well.  She is diffusely tender to touch over the shoulder along the anterior aspect near the clavicle and AC joint but also along the posterior, along the scapular ridge and into the rotator cuff tendons.  She is tender with palpation of the medial malleolus of the elbow and into the biceps muscles, and any movement causes her pain.  She is able to fully extend at the elbow, but she says it hurts.  Her hands are normal.  Normal  strength.  Normal radial pulses.  Normal skin color without any visible bruising.     CHEST WALL:  Tender to touch on the right side but no bruising or deformity.  No crepitus.   LUNGS:  Clear to auscultation bilaterally.   CARDIOVASCULAR:  Regular rate and rhythm without murmur.      ASSESSMENT:   1.  Right shoulder pain.   2.  Right rib pain.   3.  Right upper arm pain.      PLAN:  We opted to go ahead with the MRI of the right shoulder.  I want to rule out any tears or other damage before I start recommending more aggressive physical therapy and rehabilitation for her.  See orders.  I  offered another work letter, but she says she is good for now, and we will notify her with MRI results and move forward from there.         KAZ MOORE MD             D: 2018   T: 2018   MT: LEIGH ANN      Name:     EMMANUEL RENO   MRN:      [id number]        Account:      [id number]   :      1995           Visit Date:   2018      Document: [id number]

## 2018-07-18 NOTE — MR AVS SNAPSHOT
After Visit Summary   7/18/2018    Susan Delarosa    MRN: [id number]           Patient Information     Date Of Birth          1995        Visit Information        Provider Department      7/18/2018 9:30 AM Mercedes Jewell MD [facility]        Today's Diagnoses     Acute pain of right shoulder    -  1    Rib pain on right side        Pain of right upper arm           Follow-ups after your visit        Your next 10 appointments already scheduled     Jul 19, 2018  2:00 PM CDT   MR SHOULDER RIGHT W/O CONTRAST with PHMR1   [facility] ([facility])    [address] [place] [address] [phone]           Take your medicines as usual, unless your doctor tells you not to. Bring a list of your current medicines to your exam (including vitamins, minerals and over-the-counter drugs). Also bring the results of similar scans you may have had.  Please remove any body piercings and hair extensions before you arrive.  Follow your doctor s orders. If you do not, we may have to postpone your exam.  You may or may not receive IV contrast for this exam pending the discretion of the Radiologist.  You do not need to do anything special to prepare.  The MRI machine uses a strong magnet. Please wear clothes without metal (snaps, zippers). A sweatsuit works well, or we may give you a hospital gown.   **IMPORTANT** THE INSTRUCTIONS BELOW ARE ONLY FOR THOSE PATIENTS WHO HAVE BEEN PRESCRIBED SEDATION OR GENERAL ANESTHESIA DURING THEIR MRI PROCEDURE:  IF YOUR DOCTOR PRESCRIBED ORAL SEDATION (take medicine to help you relax during your exam):   You must get the medicine from your doctor (oral medication) before you arrive. Bring the medicine to the exam. Do not take it at home. You ll be told when to take it upon arriving for your exam.   Arrive one hour early. Bring someone who can take you home after the test. Your medicine will make you sleepy.  After the exam, you may not drive, take a bus or take a taxi by yourself.  IF YOUR DOCTOR PRESCRIBED IV SEDATION:   Arrive one hour early. Bring someone who can take you home after the test. Your medicine will make you sleepy. After the exam, you may not drive, take a bus or take a taxi by yourself.   No eating 6 hours before your exam. You may have clear liquids up until 4 hours before your exam. (Clear liquids include water, clear tea, black coffee and fruit juice without pulp.)  IF YOUR DOCTOR PRESCRIBED ANESTHESIA (be asleep for your exam):   Arrive 1 1/2 hours early. Bring someone who can take you home after the test. You may not drive, take a bus or take a taxi by yourself.   No eating 8 hours before your exam. You may have clear liquids up until 4 hours before your exam. (Clear liquids include water, clear tea, black coffee and fruit juice without pulp.)   You will spend four to five hours in the recovery room.  Please call the Imaging Department at your exam site with any questions.              Future tests that were ordered for you today     Open Future Orders        Priority Expected Expires Ordered    MR Shoulder Right w/o Contrast Routine  7/18/2019 7/18/2018            Who to contact     If you have questions or need follow up information about today's clinic visit or your schedule please contact [facility] directly at [phone].  Normal or non-critical lab and imaging results will be communicated to you by MyChart, letter or phone within 4 business days after the clinic has received the results. If you do not hear from us within 7 days, please contact the clinic through [employer] or phone. If you have a critical or abnormal lab result, we will notify you by phone as soon as possible.  Submit refill requests through [employer] or call your pharmacy and they will forward the refill request to us. Please allow 3 business days for your refill to be completed.          Additional Information  "About Your Visit        Care EveryWhere ID     This is your Care EveryWhere ID. This could be used by other organizations to access your [place] medical records  [id number]        Your Vitals Were     Pulse Temperature Height Pulse Oximetry BMI (Body Mass Index)       134 97.9  F (36.6  C) (Temporal) 5' 3\" (1.6 m) 98% 37.55 kg/m2        Blood Pressure from Last 3 Encounters:   07/18/18 112/68   07/11/18 108/76   01/11/17 124/76    Weight from Last 3 Encounters:   07/18/18 212 lb (96.2 kg)   07/11/18 214 lb 6.4 oz (97.3 kg)   01/11/17 189 lb 12 oz (86.1 kg)               Primary Care Provider Office Phone # Fax #    Mercedes Sara Jewell [phone] [address] [place] [address]        Equal Access to Services     YUDI OREILLY AH: Hadii teresa ku valo Sopauly, waaxda luqadaha, qaybta kaalmada adeegyada, mirela avalosn salvador carcamo . So [facility] [phone].    ATENCIÓN: Si habla español, tiene a andrews disposición servicios gratuitos de asistencia lingüística. Llame al [phone].    We comply with applicable federal civil rights laws and [place] laws. We do not discriminate on the basis of race, color, national origin, age, disability, sex, sexual orientation, or gender identity.            Thank you!     Thank you for choosing [facility]  for your care. Our goal is always to provide you with excellent care. Hearing back from our patients is one way we can continue to improve our services. Please take a few minutes to complete the written survey that you may receive in the mail after your visit with us. Thank you!             Your Updated Medication List - Protect others around you: Learn how to safely use, store and throw away your medicines at www.disposemymeds.org.          This list is accurate as of 7/18/18 10:16 AM.  Always use your most recent med list.                   Brand Name Dispense Instructions for use Diagnosis    " HYDROcodone-acetaminophen 5-325 MG per tablet    NORCO    20 tablet    Take 1 tablet by mouth every 6 hours as needed for severe pain    Acute pain of right shoulder, Rib pain on right side       ibuprofen 600 MG tablet    ADVIL/MOTRIN    90 tablet    Take 1 tablet (600 mg) by mouth every 6 hours as needed for moderate pain    Acute pain of right shoulder, Rib pain on right side       levonorgestrel-ethinyl estradiol 0.1-20 MG-MCG per tablet    AVIANE,ALESSE,LESSINA    84 tablet    Take 1 tablet by mouth daily    Oral contraceptive pill surveillance

## 2018-07-19 ENCOUNTER — HOSPITAL ENCOUNTER (OUTPATIENT)
Dept: MRI IMAGING | Facility: CLINIC | Age: 23
Discharge: HOME | End: 2018-07-19
Attending: FAMILY MEDICINE | Admitting: FAMILY MEDICINE

## 2018-07-19 DIAGNOSIS — M25.511 ACUTE PAIN OF RIGHT SHOULDER: ICD-10-CM

## 2018-07-19 DIAGNOSIS — M79.621 PAIN OF RIGHT UPPER ARM: ICD-10-CM

## 2018-07-19 PROCEDURE — 73221 MRI JOINT UPR EXTREM W/O DYE: CPT | Mod: RT

## 2018-07-25 ENCOUNTER — TELEPHONE (OUTPATIENT)
Dept: FAMILY MEDICINE | Facility: CLINIC | Age: 23
End: 2018-07-25

## 2018-07-25 DIAGNOSIS — M25.511 ACUTE PAIN OF RIGHT SHOULDER: Primary | ICD-10-CM

## 2018-07-25 NOTE — PROGRESS NOTES
Notify Paloma (Susan) that her MRI is completely normal. I suspect she just sprained this shoulder and we need to get it moving now in rehab. Please set her up for PT referral for rehab acute right shoulder pain from a fall without tear or other injury.  Have her stop using the sling and gradually increase her motion   Mercedes Jewell MD

## 2018-07-25 NOTE — LETTER
[address]   [address]  Tel. [phone] / Fax [phone]    July 27, 2018        Susan Delarosa  [address] [place] [address] [address] [place] [address]          Dear Susan,  Your MRI is completely normal. I suspect you just sprained this shoulder and we need to get it moving now in rehab. A referral for Physical therapy has been placed. Stop using the sling and gradually increase your motion. Physical Therapy will contact you to set up an appointment.    Sincerely,        Mercedes Jewell M.D./ar

## 2018-07-25 NOTE — TELEPHONE ENCOUNTER
Left message for patient to return call. Please give message below.     Notify Paloma (Susan) that her MRI is completely normal. I suspect she just sprained this shoulder and we need to get it moving now in rehab. Please set her up for PT referral for rehab acute right shoulder pain from a fall without tear or other injury.  Have her stop using the sling and gradually increase her motion   Mercedes Jewell MD     PT referral placed. They will contact her to schedule.

## 2018-07-26 NOTE — TELEPHONE ENCOUNTER
Tried to reach patient, left message for patient to call the clinic back.  Angie Gloria, [facility]

## 2019-03-26 ENCOUNTER — TELEPHONE (OUTPATIENT)
Dept: FAMILY MEDICINE | Facility: CLINIC | Age: 24
End: 2019-03-26

## 2019-03-26 NOTE — TELEPHONE ENCOUNTER
Patient can be seen for ED follow up on 4/1 at 1:30pm. Please contact patient to advise and confirm. Appointment made to hold time.  Katharine Nation CMA

## 2019-03-26 NOTE — TELEPHONE ENCOUNTER
Reason for Call: Work in Appointment, Requested Provider:  Mercedes Jewell M.D.    PCP: Mercedes Jewell    Reason for visit: ED follow up- fall at work 3.26. Was seen at ED in [facility].    Duration of symptoms: today 3.26    Have you been treated for this in the past? No    Additional comments: Pt needs follow up appt with primary physician. Please advise if she can be worked in 5-7 days.     Can we leave a detailed message on this number? YES    Phone number patient can be reached at: Home number on file [phone] (home)    Best Time: any    Call taken on 3/26/2019 at 12:29 PM by Sakina Santiago

## 2019-04-01 ENCOUNTER — OFFICE VISIT (OUTPATIENT)
Dept: FAMILY MEDICINE | Facility: CLINIC | Age: 24
End: 2019-04-01
Payer: OTHER MISCELLANEOUS

## 2019-04-01 VITALS
TEMPERATURE: 96.2 F | OXYGEN SATURATION: 97 % | RESPIRATION RATE: 18 BRPM | WEIGHT: 202 LBS | HEIGHT: 63 IN | BODY MASS INDEX: 35.79 KG/M2 | SYSTOLIC BLOOD PRESSURE: 124 MMHG | DIASTOLIC BLOOD PRESSURE: 84 MMHG | HEART RATE: 120 BPM

## 2019-04-01 DIAGNOSIS — S06.0X9D CONCUSSION WITH LOSS OF CONSCIOUSNESS, SUBSEQUENT ENCOUNTER: Primary | ICD-10-CM

## 2019-04-01 DIAGNOSIS — S43.101D AC SEPARATION, RIGHT, SUBSEQUENT ENCOUNTER: ICD-10-CM

## 2019-04-01 DIAGNOSIS — M54.10 RADICULAR PAIN OF RIGHT LOWER EXTREMITY: ICD-10-CM

## 2019-04-01 DIAGNOSIS — M25.511 ACUTE PAIN OF RIGHT SHOULDER: ICD-10-CM

## 2019-04-01 PROCEDURE — 99214 OFFICE O/P EST MOD 30 MIN: CPT | Performed by: FAMILY MEDICINE

## 2019-04-01 ASSESSMENT — PAIN SCALES - GENERAL: PAINLEVEL: EXTREME PAIN (8)

## 2019-04-01 ASSESSMENT — MIFFLIN-ST. JEOR: SCORE: 1639.36

## 2019-04-01 NOTE — LETTER
[address]   [address]  Tel. [phone] / Fax [phone]    April 1, 2019      Re:   Susan Dowell  [facility] [place] [address]          To whom it may concern,     Susan Holguin) is under my care for a work related concussion. She is going to be evaluated in our concussion rehab program before determining her workability. At this time she is restricted from work.       Sincerely,        Mercedes Jewell M.D.

## 2019-04-02 NOTE — PROGRESS NOTES
Visit Date:   04/01/2019      SUBJECTIVE:  Paloma comes in today with her  to follow up on a work comp injury.  On 03/25 she slipped on the ice while at work and she landed flat on her back and hit her head on the ground.  She lost consciousness.  She was seen in the emergency room the following day on 03/26 and diagnosed with a concussion as well as mild right AC separation and low back pain.  She was given a shoulder sling and taken off work and told to follow up now.  She did have a head CT scan and a CT scan of her lower back to rule out fracture or other acute pathology and it was normal.  She was told to treat conservatively and avoid exertion and to follow up.  She says that she continues to have significant symptoms.  She gets dizziness, headaches, loss of balance, some vomiting yet and still has shoulder pain and low back pain.  She threw up yesterday even after taking Zofran.  She has been wearing her right shoulder sling mostly around the house.  She takes it off, but she wears it when going out.  She still gets vision changes with blurry and hazy vision off and on.  She does wear glasses.  She did see her work and doctor on 03/28 who did not suggest any changes for her.  She also complains of a tingling and numbness going down her right leg and her right foot goes numb all of a sudden.  This is brand new since the accident.  She would like to get back to work, but she does not want to overdo it and have prolonged injuries.      Her medical history is really noncontributory.  She does have some chronic headaches, but this is a new type of headache.  It hurts in the back of her head where she slammed her head on the ground.  She also gets some headaches associated with dizziness and vision changes.  She has no relevant surgical history.       MEDICATIONS:  She is using Tylenol and ibuprofen on an alternating basis.      REVIEW OF SYSTEMS:  Essentially, her review of systems is positive.  She hurts all  over and she does not feel well.  Please see notes above.  She is sore in the back and shoulders and it hurts to use her arm.  She is sore in the neck.      OBJECTIVE:   VITAL SIGNS:  Noted.   GENERAL:  The patient is alert, oriented and in no acute distress.  She is a little guarded on exam.  Her right shoulder sling was removed.   HEENT:  Her pupils are equally round and reactive to light and accommodation.  Extraocular movements are intact.  Head is atraumatic and normocephalic.  I do not appreciate any abrasions or lacerations to the scalp.  She is tender with palpation of the mid occiput and there is a slight bump there consistent with a contusion.   NECK:  Supple without lymphadenopathy.  She has good range of motion.   EXTREMITIES:  Her right shoulder is tender to palpation in the AC joint area and in the posterior shoulder.  Abduction of the arm at the shoulder causes pain on the right.  I did not do an aggressive shoulder exam.   CARDIOVASCULAR:  Regular rate and rhythm without murmur.   LUNGS:  Clear to auscultation bilaterally.  She is tender with palpation of the mid lumbar spine as well as the paraspinal muscles, more on the right than the left.  Also, significantly tender with palpation of the right SI joint and not the left SI joint.  Straight leg raise is positive on the right at about 30 degrees and also causes pain in the posterior hamstrings and buttocks, but does cause radicular symptoms going down the leg.  She has tension with left straight leg raise in the posterior hamstrings, but no radicular symptoms on exam.  Her gait is normal.      ASSESSMENT:   1.  Concussion with loss of consciousness, subsequent encounter.   2.  Acute pain in the right shoulder.   3.  Acute AC joint separation of the right shoulder.   4.  Radicular pain in the right lower extremity.      PLAN:  Discussed with Paloma that I want her to stay off work for now and be seen in our physical therapy for concussion management as  well as for possible right arm and back physical therapy.  I did place this referral today.  I think it will be imperative to her return to work to get her in therapy right away.  We talked about using ice on all of her sore areas and continuing with ibuprofen and Tylenol on an alternating basis.  I am going to see her back in 3-4 weeks for a followup on her concussion and her shoulder and back pain and will follow up with me sooner if needed.         KAZ MOORE MD             D: 2019   T: 2019   MT: [place]      Name:     EMMANUEL SIMMS   MRN:      [id number]        Account:      [id number]   :      1995           Visit Date:   2019      Document: [id number]

## 2019-04-04 ENCOUNTER — TELEPHONE (OUTPATIENT)
Dept: FAMILY MEDICINE | Facility: CLINIC | Age: 24
End: 2019-04-04

## 2019-04-04 DIAGNOSIS — S06.0X9D CONCUSSION WITH LOSS OF CONSCIOUSNESS, SUBSEQUENT ENCOUNTER: Primary | ICD-10-CM

## 2019-04-04 NOTE — TELEPHONE ENCOUNTER
Reason for Call:  Other call back    Detailed comments: Pt calling stating she got in touch with the concussion program and their first availability is 5/14 at 4pm. They are currently trying to get her worked in earlier because it is WC. She has appt with KA on 4/26, wondering if you want to keep that appt or push it out after her first concussion appt? Please call and advise.    Phone Number Patient can be reached at: Home number on file [phone] (home) - yes you can leave a secured message on her phone if no answer.    Best Time: anytime    Can we leave a detailed message on this number? YES    Call taken on 4/4/2019 at 12:11 PM by Josey Perez

## 2019-04-09 NOTE — TELEPHONE ENCOUNTER
Spoke with [facility], first appt does need to be down at the [facility] but they had a cancellation so able to reschedule pt for 4/17/19 @8am 3rd floor. Pt notified and will try to find a ride. Dasha Lewis, CMA

## 2019-04-17 ENCOUNTER — OFFICE VISIT (OUTPATIENT)
Dept: PHYSICAL MEDICINE AND REHAB | Facility: CLINIC | Age: 24
End: 2019-04-17
Payer: OTHER MISCELLANEOUS

## 2019-04-17 ENCOUNTER — TELEPHONE (OUTPATIENT)
Dept: FAMILY MEDICINE | Facility: CLINIC | Age: 24
End: 2019-04-17

## 2019-04-17 VITALS
OXYGEN SATURATION: 98 % | WEIGHT: 202 LBS | DIASTOLIC BLOOD PRESSURE: 83 MMHG | BODY MASS INDEX: 35.79 KG/M2 | SYSTOLIC BLOOD PRESSURE: 119 MMHG | HEIGHT: 63 IN | HEART RATE: 89 BPM

## 2019-04-17 DIAGNOSIS — S06.0X1A CONCUSSION WITH LOSS OF CONSCIOUSNESS OF 30 MINUTES OR LESS, INITIAL ENCOUNTER: Primary | ICD-10-CM

## 2019-04-17 DIAGNOSIS — R26.89 BALANCE PROBLEMS: ICD-10-CM

## 2019-04-17 DIAGNOSIS — M54.2 NECK PAIN: ICD-10-CM

## 2019-04-17 DIAGNOSIS — G44.319 ACUTE POST-TRAUMATIC HEADACHE, NOT INTRACTABLE: ICD-10-CM

## 2019-04-17 RX ORDER — GABAPENTIN 300 MG/1
300 CAPSULE ORAL 3 TIMES DAILY
Qty: 90 CAPSULE | Refills: 0 | Status: SHIPPED | OUTPATIENT
Start: 2019-04-17 | End: 2019-05-15

## 2019-04-17 RX ORDER — METHOCARBAMOL 500 MG/1
500 TABLET, FILM COATED ORAL 4 TIMES DAILY PRN
Qty: 60 TABLET | Refills: 0 | Status: SHIPPED | OUTPATIENT
Start: 2019-04-17 | End: 2019-05-15

## 2019-04-17 ASSESSMENT — ANXIETY QUESTIONNAIRES
3. WORRYING TOO MUCH ABOUT DIFFERENT THINGS: SEVERAL DAYS
GAD7 TOTAL SCORE: 6
7. FEELING AFRAID AS IF SOMETHING AWFUL MIGHT HAPPEN: SEVERAL DAYS
GAD7 TOTAL SCORE: 6
GAD7 TOTAL SCORE: 6
7. FEELING AFRAID AS IF SOMETHING AWFUL MIGHT HAPPEN: SEVERAL DAYS
2. NOT BEING ABLE TO STOP OR CONTROL WORRYING: SEVERAL DAYS
6. BECOMING EASILY ANNOYED OR IRRITABLE: SEVERAL DAYS
1. FEELING NERVOUS, ANXIOUS, OR ON EDGE: SEVERAL DAYS
4. TROUBLE RELAXING: SEVERAL DAYS
5. BEING SO RESTLESS THAT IT IS HARD TO SIT STILL: NOT AT ALL

## 2019-04-17 ASSESSMENT — MIFFLIN-ST. JEOR: SCORE: 1635.4

## 2019-04-17 ASSESSMENT — PATIENT HEALTH QUESTIONNAIRE - PHQ9
SUM OF ALL RESPONSES TO PHQ QUESTIONS 1-9: 7
SUM OF ALL RESPONSES TO PHQ QUESTIONS 1-9: 7

## 2019-04-17 ASSESSMENT — PAIN SCALES - GENERAL: PAINLEVEL: EXTREME PAIN (8)

## 2019-04-17 NOTE — TELEPHONE ENCOUNTER
Reason for Call:  Other call back    Detailed comments: Patient calling asking if the referral for Physical Therapy is something she will be coming to [place] for if it will be in the [facility] where she lives. Please call and advise     Phone Number Patient can be reached at: Cell number on file:    Telephone Information:   Mobile [phone]       Best Time: any     Can we leave a detailed message on this number? YES    Call taken on 4/17/2019 at 10:14 AM by Sofia Davis

## 2019-04-17 NOTE — PATIENT INSTRUCTIONS
"      Return in 4 weeks        GENERAL ADVICE  ~ Gradually ease back into your usual activities.  ~ Rest as needed to help your symptoms go away.   - Consider pairing 50 minutes of activity with 10 minutes of rest.  ~ Allow yourself more time for activities.  ~ Write things down.  At home, at work, whenever there is something that you should remember, even it is simple.    SCREENS  ~ Change settings on your phone and computer using the \"Blue Light Filter\" (Night Shift on all Apple products)   ~ The goal is making screens more yellow and less blue.     ~ If this is not an option you can download this program: AudioBoo, to adjust your screen resolution.  ~ Turn screen brightness down  ~ Increase font size      NECK PAIN  ~ Ice or Heat are good~  ~ Massage is ok if it doesn't trigger more symptoms~  ~ Gentle stretches and range-of-motion are helpful    FATIGUE  ~ Daily exercise is strongly encouraged.  Start with a 10 min walk and increase the time as tolerated until you are walking 30 minutes per day.      ANXIETY OR MOOD SWINGS:  ~ If you are irritable or anxious, take a break in a quiet room.  ~ Try using the free Calm daniel for guided breathing and mindfulness/meditation.  ~ Explore Pearl.com (https://www.headspace.com) for free and easy-to-use meditation guidance.    Diet:  - In principle incorporate more protein, lots of veggies, some fruit, whole grains.    - Less sweets and saturated fat.   - Mediterranean Diet is an easy-to-follow example.  ~ Drink plenty of water throughout the day (8-10 glasses per day)      Ofe Alfaro LPN ,Nurse care coordinator  [facility]   Phone# [phone]   Fax # [phone]  Scheduling; # [phone]    "

## 2019-04-17 NOTE — LETTER
To whom it may concern,    I was able to see Susan Dowell in the concussion clinic today at the [facility].  As you may be aware, she suffered a concussion on 3/25/19 while at work, and since that time has had significant symptoms related to the concussion.  Due to these symptoms, she would have a very difficult time completing her job duties and I would recommend not working at this time to allow for further healing and improvement.  We have started with therapies and some medications to assist with this and I will re-evaluate in 4 weeks.  Please feel free to contact me with any questions.      Sincerely,           Davonte Means MD  4/17/2019

## 2019-04-17 NOTE — LETTER
"4/17/2019       RE: Susan Dowell  [address]    [place] [address]     Dear Colleague,    Thank you for referring your patient, Susan Dowell, to the [facility] PHYSICAL MEDICINE AND REHABILITATION at [facility]. Please see a copy of my visit note below.    [facility] - New Patient Note   Susan Dowell  [id number]  Date of Evaluation: 4/17/2019  Referring Physician: Dr. Jewell  Reason for consult: Concussion  HPI:  Susan Dowell is a 23 year old F who presents to the PM&R clinic today for evaluation of a concussion.  Injury occurred due to a slip on the ice on 3/25/19 while at work.  She says she fell backwards and had LOC of 1-2 minutes with disorientation afterwards.  Was seen in the emergency room the next morning, where a CT scan of the head was negative.    Her biggest symptom affecting her at this time are headaches which occur daily and lasts for several hours.  She is taking Tylenol and Advil which \"dull the edge\".  Describes the HAs as \"pounding\" and located at the posterior aspect of head.  She also has nausea and vomiting, which are not relieved with Zofran.  She tried to eat smaller meals, but this did not help.  Other physical symptoms include right sided neck pain for which she uses ice and heat, R shoulder pain from bracing herself for the fall (says she has \"AC joint pain\"), poor sleep, dizziness, numbness and tingling of the R leg, and decreased balance.  She also endorses photo and phonophobia.    Cognitively she says she is feels \"foggy\", and is having difficulty focusing and concentrating.   Psychiatrically she says she feels \"not herself\", and her mood is quite frustrated.    She has not done any therapies as of yet.   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  Her job duties include phone calls, price quotes, etc.  She says she spends multiple hours per " day outside, but there is not much physical activity of lifting with her job.    She denies any prior head injuries.  Has history of migraines.    Psych history:  Denies any history of anxiety or depression.  PMH:  Past Medical History:   Diagnosis Date     Acne      Headache(784.0)      Unspecified chronic suppurative otitis media     Recurrent left otitis     PSH:  Past Surgical History:   Procedure Laterality Date     ESOPHAGOSCOPY, GASTROSCOPY, DUODENOSCOPY (EGD), COMBINED N/A 8/31/2016    Procedure: COMBINED ESOPHAGOSCOPY, GASTROSCOPY, DUODENOSCOPY (EGD), BIOPSY SINGLE OR MULTIPLE;  Surgeon: Jacob Gonzales MD;  Location: [facility]     HC CREATE EARDRUM OPENING,GEN ANESTH  1997    P.E. Tubes lt     HC CREATE EARDRUM OPENING,GEN ANESTH  04/11/2005    Left myringotomy with PE tube placement.     Allergies:  Allergies   Allergen Reactions     Latex Hives and Rash     band-aids     Medications:  Current Outpatient Medications   Medication     ibuprofen (ADVIL/MOTRIN) 600 MG tablet     levonorgestrel-ethinyl estradiol (SRONYX) 0.1-20 MG-MCG tablet     No current facility-administered medications for this visit.        Social History:  Social History     Socioeconomic History     Marital status:      Spouse name: Not on file     Number of children: Not on file     Years of education: Not on file     Highest education level: Not on file   Occupational History     Occupation: [employer]   Social Needs     Financial resource strain: Not on file     Food insecurity:     Worry: Not on file     Inability: Not on file     Transportation needs:     Medical: Not on file     Non-medical: Not on file   Tobacco Use     Smoking status: Never Smoker     Smokeless tobacco: Never Used   Substance and Sexual Activity     Alcohol use: No     Alcohol/week: 0.0 oz     Drug use: No     Sexual activity: Yes     Partners: Male     Birth control/protection: Pill, Condom   Lifestyle     Physical activity:     Days per week: Not  "on file     Minutes per session: Not on file     Stress: Not on file   Relationships     Social connections:     Talks on phone: Not on file     Gets together: Not on file     Attends Hinduism service: Not on file     Active member of club or organization: Not on file     Attends meetings of clubs or organizations: Not on file     Relationship status: Not on file     Intimate partner violence:     Fear of current or ex partner: Not on file     Emotionally abused: Not on file     Physically abused: Not on file     Forced sexual activity: Not on file   Other Topics Concern     Not on file   Social History Narrative     Not on file     Tobacco: Denies  Alcohol: Rare  Illicit drugs:  Denies  Employment:  As per HPI  Review of Systems - History obtained from chart review and the patient  General ROS: positive for  - sleep disturbance  Psychological ROS: positive for - cognitive deficits, sleep disturbances and frustration  Respiratory ROS: no cough, shortness of breath, or wheezing  Cardiovascular ROS: no chest pain or dyspnea on exertion  Gastrointestinal ROS: no abdominal pain, change in bowel habits, or black or bloody stools  Genito-Urinary ROS: no dysuria, trouble voiding, or hematuria  Musculoskeletal ROS: positive for - Decreased balance, R shoulder pain  Neurological ROS: positive for - HAs, cognitive deficits, numbness/tingling of R leg, dizziness, poor sleep  Dermatological ROS: negative for rash  Functional History:  Independent with ambulation, all ADLs, and all IADLs    Physical Exam:  /83   Pulse 89   Ht 1.6 m (5' 3\")   Wt 91.6 kg (202 lb)   SpO2 98%   BMI 35.78 kg/m     Gen: NAD  Skin: No rashes noted  HEENT: Myofascial pain over upper and middle traps,  FROM to neck, pain with turning to the left  Pulm: Non-labored breathing  GI: Soft, NT/ND  Ext: No LE Edema, DPs 2+, no calf tenderness  Neuro: AAOx3, follows commands, answers appropriately  Spasticity MAS: 0  MMT 5/5 to all " extremities  Reflexes 2+, symmetrical  Gait: LOB noted  Romberg +Sway  Unable to stand heel toe  Sensation diminised R lateral calf    Imaging:  CT Head (3/26/19)  IMPRESSION:  No CT evidence of acute intracranial process.  CT L-Spine (3/26/19)  IMPRESSION:  1. No acute or significant osseous findings confirmed.  XR R Shoulder (3/26/19)  IMPRESSION:  Mildly prominent AC joint questionable for grade 1 AC separation.  Negative for fracture or subluxation.  Assessment:  Susan Dwoell is a 23 year old woman suffered a concussion on 3/25/19 due to a slip on the ice in a work related incident.  Has multiple symptoms, most predominant are headaches.   Recommendations:  -Discussed the natural history of concussions with the patient.  She is about 3 weeks out from her injury, and she is still within a typical time frame to still have symptoms.  However, her symptoms continue to be quite significant without improvement.  -Will refer to physical therapy for cervical stretching and strengthening exercises, and to work on balance and gait  -Robaxin PRN for muscle relaxant  -Will start Neurontin for headaches.  Will titrate up to 300 mg TID, instructions provided on how to do this.  -Given significant symptoms, will keep off work for now to allow for cognitive rest and healing  -Discussed that strict rest is not recommended, and she should do both physical and mental activity.  However she should start slowly and with low amounts of both, and gradually increase as tolerated.    -Discussed limiting screen time and I encouraged the use of blue light filters  -RTC 4 weeks  Davonte Means MD  Department of Rehabilitation Medicine  Pager: [phone]    Time Spent on this Encounter   I, Davonte Means, spent a total of 45 minutes in the clinic today managing the care of Susan Dowell.  Over 50% of my time was spent counseling the patient and /or coordinating care regarding. See note for details.

## 2019-04-17 NOTE — NURSING NOTE
"Chief Complaint   Patient presents with     Head Injury     concussion w/loc 3/25/2019- Fall / slip on ice with posterior head strike.       Vitals:    04/17/19 0757   BP: 119/83   Pulse: 89   SpO2: 98%   Weight: 91.6 kg (202 lb)   Height: 1.6 m (5' 3\")       Body mass index is 35.78 kg/m .      PAT-7 SCORE 4/17/2019   Total Score 6 (mild anxiety)   Total Score 6     PHQ-9 SCORE 4/17/2019   PHQ-9 Total Score MyChart 7 (Mild depression)   PHQ-9 Total Score 7     CONCUSSION SYMPTOMS ASSESSMENT 4/17/2019   Headache or Pressure In Head 5 - severe   Upset Stomach or Throwing Up 6 - excruciating   Problems with Balance 5 - severe   Feeling Dizzy 6 - excruciating   Sensitivity to Light 6 - excruciating   Sensitivity to Noise 6 - excruciating   Mood Changes 6 - excruciating   Feeling sluggish, hazy, or foggy 6 - excruciating   Trouble Concentrating, Lack of Focus 6 - excruciating   Motion Sickness 5 - severe   Vision Changes 6 - excruciating   Memory Problems 5 - severe   Feeling Confused 6 - excruciating   Neck Pain 6 - excruciating   Trouble Sleeping 6 - excruciating   Total Number of Symptoms 15   Symptom Severity Score 86                         "

## 2019-04-17 NOTE — PROGRESS NOTES
"[facility] PM&R Clinic - New Patient Note   Susan Dowell  [id number]  Date of Evaluation: 4/17/2019  Referring Physician: Dr. Jewell  Reason for consult: Concussion  HPI:  Susan Dowell is a 23 year old F who presents to the PM&R clinic today for evaluation of a concussion.  Injury occurred due to a slip on the ice on 3/25/19 while at work.  She says she fell backwards and had LOC of 1-2 minutes with disorientation afterwards.  Was seen in the emergency room the next morning, where a CT scan of the head was negative.    Her biggest symptom affecting her at this time are headaches which occur daily and lasts for several hours.  She is taking Tylenol and Advil which \"dull the edge\".  Describes the HAs as \"pounding\" and located at the posterior aspect of head.  She also has nausea and vomiting, which are not relieved with Zofran.  She tried to eat smaller meals, but this did not help.  Other physical symptoms include right sided neck pain for which she uses ice and heat, R shoulder pain from bracing herself for the fall (says she has \"AC joint pain\"), poor sleep, dizziness, numbness and tingling of the R leg, and decreased balance.  She also endorses photo and phonophobia.    Cognitively she says she is feels \"foggy\", and is having difficulty focusing and concentrating.   Psychiatrically she says she feels \"not herself\", and her mood is quite frustrated.    She has not done any therapies as of yet.   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  Her job duties include phone calls, price quotes, etc.  She says she spends multiple hours per day outside, but there is not much physical activity of lifting with her job.    She denies any prior head injuries.  Has history of migraines.    Psych history:  Denies any history of anxiety or depression.  PMH:  Past Medical History:   Diagnosis Date     Acne      Headache(784.0)      Unspecified chronic suppurative otitis " media     Recurrent left otitis       PSH:  Past Surgical History:   Procedure Laterality Date     ESOPHAGOSCOPY, GASTROSCOPY, DUODENOSCOPY (EGD), COMBINED N/A 8/31/2016    Procedure: COMBINED ESOPHAGOSCOPY, GASTROSCOPY, DUODENOSCOPY (EGD), BIOPSY SINGLE OR MULTIPLE;  Surgeon: Jacob Gonzales MD;  Location:  GI     HC CREATE EARDRUM OPENING,GEN ANESTH  1997    P.E. Tubes lt     HC CREATE EARDRUM OPENING,GEN ANESTH  04/11/2005    Left myringotomy with PE tube placement.       Allergies:  Allergies   Allergen Reactions     Latex Hives and Rash     band-aids       Medications:  Current Outpatient Medications   Medication     ibuprofen (ADVIL/MOTRIN) 600 MG tablet     levonorgestrel-ethinyl estradiol (SRONYX) 0.1-20 MG-MCG tablet     No current facility-administered medications for this visit.        Social History:  Social History     Socioeconomic History     Marital status:      Spouse name: Not on file     Number of children: Not on file     Years of education: Not on file     Highest education level: Not on file   Occupational History     Occupation: [employer]   Social Needs     Financial resource strain: Not on file     Food insecurity:     Worry: Not on file     Inability: Not on file     Transportation needs:     Medical: Not on file     Non-medical: Not on file   Tobacco Use     Smoking status: Never Smoker     Smokeless tobacco: Never Used   Substance and Sexual Activity     Alcohol use: No     Alcohol/week: 0.0 oz     Drug use: No     Sexual activity: Yes     Partners: Male     Birth control/protection: Pill, Condom   Lifestyle     Physical activity:     Days per week: Not on file     Minutes per session: Not on file     Stress: Not on file   Relationships     Social connections:     Talks on phone: Not on file     Gets together: Not on file     Attends Anglican service: Not on file     Active member of club or organization: Not on file     Attends meetings of clubs or organizations: Not  "on file     Relationship status: Not on file     Intimate partner violence:     Fear of current or ex partner: Not on file     Emotionally abused: Not on file     Physically abused: Not on file     Forced sexual activity: Not on file   Other Topics Concern     Not on file   Social History Narrative     Not on file     Tobacco: Denies  Alcohol: Rare  Illicit drugs:  Denies  Employment:  As per HPI  Review of Systems - History obtained from chart review and the patient  General ROS: positive for  - sleep disturbance  Psychological ROS: positive for - cognitive deficits, sleep disturbances and frustration  Respiratory ROS: no cough, shortness of breath, or wheezing  Cardiovascular ROS: no chest pain or dyspnea on exertion  Gastrointestinal ROS: no abdominal pain, change in bowel habits, or black or bloody stools  Genito-Urinary ROS: no dysuria, trouble voiding, or hematuria  Musculoskeletal ROS: positive for - Decreased balance, R shoulder pain  Neurological ROS: positive for - HAs, cognitive deficits, numbness/tingling of R leg, dizziness, poor sleep  Dermatological ROS: negative for rash  Functional History:  Independent with ambulation, all ADLs, and all IADLs    Physical Exam:  /83   Pulse 89   Ht 1.6 m (5' 3\")   Wt 91.6 kg (202 lb)   SpO2 98%   BMI 35.78 kg/m    Gen: NAD  Skin: No rashes noted  HEENT: Myofascial pain over upper and middle traps,  FROM to neck, pain with turning to the left  Pulm: Non-labored breathing  GI: Soft, NT/ND  Ext: No LE Edema, DPs 2+, no calf tenderness  Neuro: AAOx3, follows commands, answers appropriately  Spasticity MAS: 0  MMT 5/5 to all extremities  Reflexes 2+, symmetrical  Gait: LOB noted  Romberg +Sway  Unable to stand heel toe  Sensation diminised R lateral calf    Imaging:  CT Head (3/26/19)  IMPRESSION:  No CT evidence of acute intracranial process.  CT L-Spine (3/26/19)  IMPRESSION:  1. No acute or significant osseous findings confirmed.  XR R Shoulder " (3/26/19)  IMPRESSION:  Mildly prominent AC joint questionable for grade 1 AC separation.  Negative for fracture or subluxation.  Assessment:  Susan Dowell is a 23 year old woman suffered a concussion on 3/25/19 due to a slip on the ice in a work related incident.  Has multiple symptoms, most predominant are headaches.   Recommendations:  -Discussed the natural history of concussions with the patient.  She is about 3 weeks out from her injury, and she is still within a typical time frame to still have symptoms.  However, her symptoms continue to be quite significant without improvement.  -Will refer to physical therapy for cervical stretching and strengthening exercises, and to work on balance and gait  -Robaxin PRN for muscle relaxant  -Will start Neurontin for headaches.  Will titrate up to 300 mg TID, instructions provided on how to do this.  -Given significant symptoms, will keep off work for now to allow for cognitive rest and healing  -Discussed that strict rest is not recommended, and she should do both physical and mental activity.  However she should start slowly and with low amounts of both, and gradually increase as tolerated.    -Discussed limiting screen time and I encouraged the use of blue light filters  -RTC 4 weeks  Davonte Means MD  Department of Rehabilitation Medicine  Pager: [phone]    Answers for HPI/ROS submitted by the patient on 4/17/2019   PHQ9 TOTAL SCORE: 7  PAT 7 TOTAL SCORE: 6    Time Spent on this Encounter   I, Davonte Means, spent a total of 45 minutes in the clinic today managing the care of Susan Dowell.  Over 50% of my time was spent counseling the patient and /or coordinating care regarding. See note for details.

## 2019-04-18 ASSESSMENT — ANXIETY QUESTIONNAIRES: GAD7 TOTAL SCORE: 6

## 2019-04-19 NOTE — TELEPHONE ENCOUNTER
Can be any location.  External referral was given, she can take to location most convenient for her.  Please notify patient.

## 2019-04-29 ENCOUNTER — TELEPHONE (OUTPATIENT)
Dept: FAMILY MEDICINE | Facility: CLINIC | Age: 24
End: 2019-04-29

## 2019-04-29 NOTE — TELEPHONE ENCOUNTER
Patient can be worked in for follow up visit on 5/3 at 1:15pm. Please contact patient to advise and confirm. Appointment made to hold time.  Katharine Nation CMA

## 2019-04-29 NOTE — TELEPHONE ENCOUNTER
Patient called and message relayed to her. Please advise when patient can be worked in to see Dr Jewell.

## 2019-04-29 NOTE — TELEPHONE ENCOUNTER
[facility] paperwork received for patient. She states to complete and fax back to [phone]. Patient was scheduled to see Mercedes Jewell MD on 4/25 but did not show for this appointment. Per Mercedes Jewell MD these forms can not be completed until comes in for a follow up work comp visit. Calling patient to advise.  LM for patient to call [phone]   Katharine Nation CMA

## 2019-04-30 ENCOUNTER — HOSPITAL ENCOUNTER (OUTPATIENT)
Dept: PHYSICAL THERAPY | Facility: CLINIC | Age: 24
Setting detail: THERAPIES SERIES
End: 2019-04-30
Attending: PHYSICAL MEDICINE & REHABILITATION
Payer: OTHER MISCELLANEOUS

## 2019-04-30 PROCEDURE — 97162 PT EVAL MOD COMPLEX 30 MIN: CPT | Mod: GP | Performed by: PHYSICAL THERAPIST

## 2019-04-30 PROCEDURE — 95992 CANALITH REPOSITIONING PROC: CPT | Mod: GP | Performed by: PHYSICAL THERAPIST

## 2019-04-30 NOTE — PROGRESS NOTES
04/30/19 0900   Quick Adds   Quick Adds Vestibular Eval   Type of Visit Initial OP PT Evaluation   General Information   Start of Care Date 04/30/19   Referring Physician Dr. Magdalena Means   Orders Evaluate and Treat as Indicated   Order Date 04/17/19   Medical Diagnosis Concussion with LOC   Onset of illness/injury or Date of Surgery 03/25/19   Precautions/Limitations no known precautions/limitations   Surgical/Medical history reviewed Yes   Pertinent history of current vestibular problem (include personal factors and/or comorbidities that impact the POC)  Anxiety;Migraines  (Otitis L chronic)   Pertinent history of current problem (include personal factors and/or comorbidities that impact the POC) Paloma was walking at work outside and slipped on ice and fell straight back. Blacked out for a minute or two, took a minute to reorient.  During that shift had HA and was nauseous and was weaving to L.  Currently she works in outdoor sales selling lawn care etc.  Last day she worked was 3/35.  HAs are daily occipital pressure, gets dizzy if she lays back room spinning,   Nausea and vomiting is persistent every day,    Sleep is still interrupted by MCCLOUD.  Sleeps sometimes during the day,  feels like her vision is hazy, occasionally blurry.  Glasses are about 1 yr old.  When out walking gets dizzy, has trouble focusing.   R shoulder ACJ injury and back injury also during the fall.   Pertinent Visual History  wears glasses   Prior level of function comment indep   Previous/Current Treatment Medication(s)  (Meclazine)   Improvement after medication Mild   Current Community Support Family/friend caregiver   Patient role/Employment history Employed   [facility]   Home/Community Accessibility Comments sleeps downstairs   Patient/Family Goals Statement return to work and normal activity.    Fall Risk Screen   Fall screen completed by PT   Have you fallen 2 or more times in the past year? No   Have you  fallen and had an injury in the past year? No   Is patient a fall risk? No   System Outcome Measures   Outcome Measures Concussion (see Concussion Symptom Assessment)   Pain   Patient currently in pain Yes   Pain location HA occipital   Pain rating 6   Pain description Pressure;Throbbing;Ache   Pain description comment particularly when lying on back in bed   Additional pain locations? Pain location 2   Pain location 2 Shoulder, lower back and neck from fall   Pain rating 2 8 back, shoulder 7,  7neck   Pain comments hurts to sit, and turn head to left   Cognitive Status Examination   Orientation orientation to person, place and time   Level of Consciousness alert   Follows Commands and Answers Questions 100% of the time   Personal Safety and Judgment intact   Memory intact   Cognitive Comment anxious   Integumentary   Integumentary No deficits were identified   Posture   Posture Forward head position;Protracted shoulders   Palpation   Palpation Tight and tender to palpation over lower trapezii   Range of Motion (ROM)   ROM Comment Grossly WFL   Strength   Strength Comments Strength is grossly WFL except for R shoulder. Back and LE strength is inhibited slightly by pain but WFL   Bed Mobility   Bed Mobility Comments Supine <> sit is indep slight reversal fo dizziness worst with sitting up from L side. No nystagmus noted   Transfer Skills   Transfer Comments sit<> stand indep   Gait   Gait Comments Gait is grossly indep no LOB noted today during gait   Balance   Balance Comments Balance testing will be completed next session. No LOB noted during this visit.   Sensory Examination   Sensory Perception no deficits were identified   Coordination   Coordination no deficits were identified   Muscle Tone   Muscle Tone no deficits were identified   Muscle Tone Comments needs to work on oferall strenth   Cervicogenic Screen   Neck ROM WFL   Vertebral Artery Test Normal   Alar Ligament Test Normal   Transverse Ligament Test  Normal   Oculomotor Exam   Smooth Pursuit Normal   Saccades Normal   VOR Normal   Rapid Head Thrust Normal   Infrared Goggle Exam or Frenzel Lense Exam   Vestibular Suppressant in Last 24 Hours? No   Exam completed with Frenzel Lenses   Spontaneous Nystagmus Negative   Gaze Evoked Nystagmus Negative   Head Shake Horizontal Nystagmus Negative   [place]-Hallpike (right) Negative   [place]-Hallpike (Left) Negative   Sherman-Hallpike (left) comments No nystagmus but did have worst complaits of dixxiness here   HSCC Supine Roll Test (Right) Negative   HSCC Supine Roll Test (Right) Comments     HSCC Supine Roll Test (Left) Negative   HSCC Supine Roll Test (Left) Comments  No nystagmus but worst dizziness provoked here   BPPV Canal(s) L Posterior;L Horizontal   BPPV Type Canalithasis   Modality Interventions   Planned Modality Interventions Cryotherapy   Planned Therapy Interventions   Planned Therapy Interventions balance training;joint mobilization;neuromuscular re-education;motor coordination training;manual therapy;visual perception   Clinical Impression   Criteria for Skilled Therapeutic Interventions Met yes, treatment indicated   PT Diagnosis Concussion with multifactorial dizziness,  impaired balance and impaired activity tolerance, Headache, back, neck and shoulder pain from fall   Influenced by the following impairments anxiety, headache, back, neck and soulder pain    Functional limitations due to impairments difficulty , headache, pain, dizziness and nausea participating in ADLS and IADLS   Clinical Presentation Evolving/Changing   Clinical Presentation Rationale multiple body areas,  concussion and clinical judgement   Clinical Decision Making (Complexity) Moderate complexity   Therapy Frequency 2 times/Week   Predicted Duration of Therapy Intervention (days/wks) 90 days   Risk & Benefits of therapy have been explained Yes   Patient, Family & other staff in agreement with plan of care Yes   Clinical Impression Comments  PPW Concussion with multifactorial dizziness,  impaired balance and impaired activity tolerance, Headache, back, neck and shoulder pain from fall. Pt will benefit from skilled PT for education in activity progression post concussion, skilled evaluation and treatment of dizziness as well as back, neck and shoulder pain,  instruction in HEP for sx management and skilled application of manual techniques  and modalities to decrease sx and improve biomechanics.   Education Assessment   Preferred Learning Style Listening   Barriers to Learning No barriers   GOALS   PT Eval Goals 1;2;3   Goal 1   Goal Identifier Dizziness   Goal Description Pt able to report minimal dizziness and no nausea and vomiting x 1 week with no need for meds and 90% of her normal activity at home   Target Date 07/29/19   Goal 2   Goal Identifier Sleeping    Goal Description Pt able to sleep through the night in her usual way and not need to nap during the day   Target Date 07/29/19   Goal 3   Goal Identifier Work   Goal Description Pt able to return to work at least 1/2 day with no increase in sx  to resume her job.   Target Date 07/29/19   Total Evaluation Time   PT Lore, Moderate Complexity Minutes ([id number]) 45

## 2019-05-03 ENCOUNTER — OFFICE VISIT (OUTPATIENT)
Dept: FAMILY MEDICINE | Facility: CLINIC | Age: 24
End: 2019-05-03
Payer: OTHER MISCELLANEOUS

## 2019-05-03 VITALS
BODY MASS INDEX: 36.38 KG/M2 | SYSTOLIC BLOOD PRESSURE: 108 MMHG | DIASTOLIC BLOOD PRESSURE: 66 MMHG | WEIGHT: 205.4 LBS | RESPIRATION RATE: 18 BRPM | HEART RATE: 112 BPM | OXYGEN SATURATION: 98 % | TEMPERATURE: 99.3 F

## 2019-05-03 DIAGNOSIS — S06.0X9D CONCUSSION WITH LOSS OF CONSCIOUSNESS, SUBSEQUENT ENCOUNTER: Primary | ICD-10-CM

## 2019-05-03 DIAGNOSIS — M25.511 ACUTE PAIN OF RIGHT SHOULDER: ICD-10-CM

## 2019-05-03 DIAGNOSIS — M54.10 RADICULAR PAIN OF RIGHT LOWER EXTREMITY: ICD-10-CM

## 2019-05-03 PROCEDURE — 99213 OFFICE O/P EST LOW 20 MIN: CPT | Performed by: FAMILY MEDICINE

## 2019-05-03 ASSESSMENT — PAIN SCALES - GENERAL: PAINLEVEL: SEVERE PAIN (7)

## 2019-05-03 NOTE — PROGRESS NOTES
Subjective: Paloma presents today with her  to follow-up on her recent concussion, shoulder injury, and back injury.  She needs her [facility] paperwork completed. This is a work comp injury from 3/25/2019.  She remains off work.  She was seen by PM&R and is doing physical therapy.  She continues to have headaches, dizziness, nausea.  She still has balance issues.  She had her ear crystals repositioned yesterday and that seems to have helped a little bit.  The dizziness is less severe but still present.  They have not been working on her shoulder or back yet because they wanted to focus on her dizziness first.  I have not yet received an update from physical therapy.  She has a few appointments set up through May but they have been very busy so she will be really starting aggressively in June, twice weekly for 4 weeks.       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    Her headaches, and dizziness are aggravated by bright lights, noise, or other stimuli.  This is the main reason she cannot return to work yet.  The nausea gets better with eating smaller snacking type foods rather than big meals.    Her right shoulder is still sore.  She is doing range of motion exercises and still occasionally wears a sling for comfort.     O:  Vitals noted.  Patient alert, oriented, and in no acute distress.   Pupils are equally round and reactive.  Extraocular movements are full.  Neck is supple without lymphadenopathy or masses.  Normal range of motion of the neck.  She is tender to touch along the anterior aspect of the shoulder on the right.  No deformities.  No bruising.  No swelling.  She has range of motion in the shoulder but I did not test her formally today.  Gait is normal.    A:      ICD-10-CM    1. Concussion with loss of consciousness, subsequent encounter S06.0X9D    2. Acute pain of right shoulder M25.511    3. Radicular pain of right lower extremity M54.10       P:  I completed her paperwork.  I am  leaving her off work for now until she is cleared from a PT/ PM&R perspective.  I am seeing her back in 6 weeks.  I anticipate up to 3 months off.  We will get updates from PT when she get started in her therapy.  I encouraged her to work on range of motion exercises for the shoulder to reduce the risk of adhesive capsulitis.  No other changes made today.  See her FMLA papers.  We will follow-up sooner as needed.  Mercedes Jewell MD

## 2019-05-07 ENCOUNTER — HOSPITAL ENCOUNTER (OUTPATIENT)
Dept: PHYSICAL THERAPY | Facility: CLINIC | Age: 24
Setting detail: THERAPIES SERIES
End: 2019-05-07
Attending: PHYSICAL MEDICINE & REHABILITATION
Payer: OTHER MISCELLANEOUS

## 2019-05-07 PROCEDURE — 97110 THERAPEUTIC EXERCISES: CPT | Mod: GP | Performed by: PHYSICAL THERAPIST

## 2019-05-07 PROCEDURE — 97530 THERAPEUTIC ACTIVITIES: CPT | Mod: GP | Performed by: PHYSICAL THERAPIST

## 2019-05-08 ENCOUNTER — TELEPHONE (OUTPATIENT)
Dept: SURGERY | Facility: CLINIC | Age: 24
End: 2019-05-08

## 2019-05-15 ENCOUNTER — OFFICE VISIT (OUTPATIENT)
Dept: PHYSICAL MEDICINE AND REHAB | Facility: CLINIC | Age: 24
End: 2019-05-15
Payer: OTHER MISCELLANEOUS

## 2019-05-15 VITALS
BODY MASS INDEX: 35.79 KG/M2 | HEART RATE: 92 BPM | HEIGHT: 63 IN | DIASTOLIC BLOOD PRESSURE: 75 MMHG | OXYGEN SATURATION: 100 % | WEIGHT: 202 LBS | SYSTOLIC BLOOD PRESSURE: 115 MMHG

## 2019-05-15 DIAGNOSIS — G44.329 CHRONIC POST-TRAUMATIC HEADACHE, NOT INTRACTABLE: ICD-10-CM

## 2019-05-15 DIAGNOSIS — S06.0X1A CONCUSSION WITH LOSS OF CONSCIOUSNESS OF 30 MINUTES OR LESS, INITIAL ENCOUNTER: Primary | ICD-10-CM

## 2019-05-15 DIAGNOSIS — M54.2 NECK PAIN: ICD-10-CM

## 2019-05-15 DIAGNOSIS — R26.89 BALANCE PROBLEMS: ICD-10-CM

## 2019-05-15 DIAGNOSIS — R42 DIZZINESS: ICD-10-CM

## 2019-05-15 DIAGNOSIS — G47.09 OTHER INSOMNIA: ICD-10-CM

## 2019-05-15 RX ORDER — LANOLIN ALCOHOL/MO/W.PET/CERES
3-6 CREAM (GRAM) TOPICAL
Qty: 60 TABLET | Refills: 3 | Status: SHIPPED | OUTPATIENT
Start: 2019-05-15

## 2019-05-15 RX ORDER — GABAPENTIN 300 MG/1
300 CAPSULE ORAL 3 TIMES DAILY
Qty: 90 CAPSULE | Refills: 1 | Status: SHIPPED | OUTPATIENT
Start: 2019-05-15

## 2019-05-15 RX ORDER — METHOCARBAMOL 500 MG/1
500 TABLET, FILM COATED ORAL 4 TIMES DAILY PRN
Qty: 60 TABLET | Refills: 2 | Status: SHIPPED | OUTPATIENT
Start: 2019-05-15

## 2019-05-15 ASSESSMENT — ANXIETY QUESTIONNAIRES
6. BECOMING EASILY ANNOYED OR IRRITABLE: SEVERAL DAYS
7. FEELING AFRAID AS IF SOMETHING AWFUL MIGHT HAPPEN: SEVERAL DAYS
3. WORRYING TOO MUCH ABOUT DIFFERENT THINGS: SEVERAL DAYS
GAD7 TOTAL SCORE: 7
7. FEELING AFRAID AS IF SOMETHING AWFUL MIGHT HAPPEN: SEVERAL DAYS
GAD7 TOTAL SCORE: 7
2. NOT BEING ABLE TO STOP OR CONTROL WORRYING: SEVERAL DAYS
5. BEING SO RESTLESS THAT IT IS HARD TO SIT STILL: SEVERAL DAYS
1. FEELING NERVOUS, ANXIOUS, OR ON EDGE: SEVERAL DAYS
4. TROUBLE RELAXING: SEVERAL DAYS
GAD7 TOTAL SCORE: 7

## 2019-05-15 ASSESSMENT — PATIENT HEALTH QUESTIONNAIRE - PHQ9
10. IF YOU CHECKED OFF ANY PROBLEMS, HOW DIFFICULT HAVE THESE PROBLEMS MADE IT FOR YOU TO DO YOUR WORK, TAKE CARE OF THINGS AT HOME, OR GET ALONG WITH OTHER PEOPLE: SOMEWHAT DIFFICULT
SUM OF ALL RESPONSES TO PHQ QUESTIONS 1-9: 9
SUM OF ALL RESPONSES TO PHQ QUESTIONS 1-9: 9

## 2019-05-15 ASSESSMENT — MIFFLIN-ST. JEOR: SCORE: 1635.4

## 2019-05-15 ASSESSMENT — PAIN SCALES - GENERAL: PAINLEVEL: SEVERE PAIN (7)

## 2019-05-15 NOTE — LETTER
"5/15/2019       RE: Susan Dowell  [address]    [place] [address]     Dear Colleague,    Thank you for referring your patient, Susan Dowell, to the [facility] PHYSICAL MEDICINE AND REHABILITATION at [facility]. Please see a copy of my visit note below.    [facility] PM&R Concussion Clinic - Follow Up Patient Note   Susan Dowell  [id number]  Date of Evaluation: 5/15/2019  Date of Prior PM&R Clinic Visit: 4/17/2019  Date of Injury: 3/25/19 (WSIB)    Recall:  Susan Dowell is a 23 year old woman suffered a concussion on 3/25/19 due to a slip on the ice in a work related incident.  Has multiple symptoms, most predominant are headaches.   Interval History:  Since her initial visit, Ms. Dowell still has significant symptoms, but overall feels \"a little better\".  She has started PT, however had to wait for her initial visits and therefore she has just initiated therapies.  She still has dizziness but is slightly improved.  Also continues to have headaches which are during during the day, described as \"pounding\" and during the night \"still here\".  She continues to take Tylenol and Advil during the day, as well and Neurontin 300 mg TID and Robaxin PRN which were prescribed at the last visit.  She feels these have helped with HAs and neck pain, although she still has difficulty turning her head especially to the left.  She did not notice any side effects including worsening of dizziness since initiation of these meds.  In addition to medications, she is also using ice and heat on her neck.  Shoulder pain is significantly improved.  Continues to have nausea but sleep is improving.  Cognitively she continues to have problems with concentrating, and psychiatrically she continues to feel frustrated but mood is overall improving.      Medications:  Current Outpatient Medications   Medication     gabapentin (NEURONTIN) 300 MG capsule " "    ibuprofen (ADVIL/MOTRIN) 600 MG tablet     levonorgestrel-ethinyl estradiol (SRONYX) 0.1-20 MG-MCG tablet     methocarbamol (ROBAXIN) 500 MG tablet     No current facility-administered medications for this visit.        Review of Systems - History obtained from chart review and the patient  General ROS: positive for  - sleep disturbance, improving  Psychological ROS: positive for - cognitive deficits, frustrated mood  Respiratory ROS: no cough, shortness of breath, or wheezing  Cardiovascular ROS: no chest pain or dyspnea on exertion  Gastrointestinal ROS: no abdominal pain, change in bowel habits, or black or bloody stools  Genito-Urinary ROS: no dysuria, trouble voiding, or hematuria  Musculoskeletal ROS: positive for - Neck pain  Neurological ROS: positive for - HAs, cognitive deficits, dizziness  Dermatological ROS: negative for rash    Physical Exam:  /75   Pulse 92   Ht 1.6 m (5' 3\")   Wt 91.6 kg (202 lb)   LMP 04/29/2019   SpO2 100%   BMI 35.78 kg/m     Gen: NAD  Skin: No rashes noted  HEENT: Myofascial pain over upper and middle traps,  FROM to neck, pain with turning to the left  Pulm: Non-labored breathing  GI: Soft, NT/ND  Ext: No LE Edema, no calf tenderness  Neuro: AAOx3, follows commands, answers appropriately  MMT 5/5 to all extremities    Imaging:  CT Head (3/26/19)  IMPRESSION:  No CT evidence of acute intracranial process.  CT L-Spine (3/26/19)  IMPRESSION:  1. No acute or significant osseous findings confirmed.  XR R Shoulder (3/26/19)  IMPRESSION:  Mildly prominent AC joint questionable for grade 1 AC separation.  Negative for fracture or subluxation.  Assessment:  Susan Dowell is a 23 year old woman suffered a concussion on 3/25/19 due to a slip on the ice in a work related incident.  Has multiple symptoms, which are slightly improved, most predominant are dizziness and headaches.   Recommendations:  -Given her initial presentation with multiple and significant symptoms, " I am happy to see that we are at least moving in th e right direction and she is seeing some improvements.  -Continue Neurontin 300 mg TID for HAs.  We discussed possibly titrating the dose up, however she feels it is helping and would like to keep the current dose  -Refill of Robaxin provided  -Discussed sleep hygiene and recommended taking Melatonin 3-6 mg at night for sleep  -Continue with therapies  -Will continue to keep off of work for now, letter was provided  -RTC in 6 weeks for re-evaluation  Davonte Means MD  Department of Rehabilitation Medicine  Pager: [phone]  Time Spent on this Encounter   I, Davonte Means, spent a total of 30 minutes in the clinic today managing the care of Susan Dowell.  Over 50% of my time on the unit was spent counseling the patient and /or coordinating care.  See note for details.

## 2019-05-15 NOTE — PATIENT INSTRUCTIONS
"              GENERAL ADVICE  ~ Gradually ease back into your usual activities.  ~ Rest as needed to help your symptoms go away.  - Consider pairing 50 minutes of activity with 10 minutes of rest.  ~ Allow yourself more time for activities.  ~ Write things down.  At home, at work, whenever there is something that you should remember, even it is simple.    SCREENS  ~ Change settings on your phone and computer using the \"Blue Light Filter\" (Night Shift on all [employer] products)   ~ The goal is making screens more yellow and less blue.     ~ If this is not an option you can download this program: Awesome.me, to adjust your screen resolution.  ~ Turn screen brightness down  ~ Increase font size      NECK PAIN  ~ Ice or Heat are good~  ~ Massage is ok if it doesn't trigger more symptoms~  ~ Gentle stretches and range-of-motion are helpful    FATIGUE  ~ Daily exercise is strongly encouraged.  Start with a 10 min walk and increase the time as tolerated until you are walking 30 minutes per day.      ANXIETY OR MOOD SWINGS:  ~ If you are irritable or anxious, take a break in a quiet room.  ~ Try using the free Calm daniel for guided breathing and mindfulness/meditation.  ~ Explore Pictour.us (https://www.headspace.com) for free and easy-to-use meditation guidance.    Diet:  - In principle incorporate more protein, lots of veggies, some fruit, whole grains.    - Less sweets and saturated fat.   - Mediterranean Diet is an easy-to-follow example.  ~ Drink plenty of water throughout the day (8-10 glasses per day)      Ofe Alfaro  [facility]   Phone# [phone]   Fax # [phone]  Scheduling; # [phone]    "

## 2019-05-15 NOTE — PROGRESS NOTES
"[facility] - Follow Up Patient Note   Susan Dowell  [id number]  Date of Evaluation: 5/15/2019  Date of Prior PM&R Clinic Visit: 4/17/2019  Date of Injury: 3/25/19 (WSIB)    Recall:  Susan Dowell is a 23 year old woman suffered a concussion on 3/25/19 due to a slip on the ice in a work related incident.  Has multiple symptoms, most predominant are headaches.   Interval History:  Since her initial visit, Ms. Dowell still has significant symptoms, but overall feels \"a little better\".  She has started PT, however had to wait for her initial visits and therefore she has just initiated therapies.  She still has dizziness but is slightly improved.  Also continues to have headaches which are during during the day, described as \"pounding\" and during the night \"still here\".  She continues to take Tylenol and Advil during the day, as well and Neurontin 300 mg TID and Robaxin PRN which were prescribed at the last visit.  She feels these have helped with HAs and neck pain, although she still has difficulty turning her head especially to the left.  She did not notice any side effects including worsening of dizziness since initiation of these meds.  In addition to medications, she is also using ice and heat on her neck.  Shoulder pain is significantly improved.  Continues to have nausea but sleep is improving.  Cognitively she continues to have problems with concentrating, and psychiatrically she continues to feel frustrated but mood is overall improving.      Medications:  Current Outpatient Medications   Medication     gabapentin (NEURONTIN) 300 MG capsule     ibuprofen (ADVIL/MOTRIN) 600 MG tablet     levonorgestrel-ethinyl estradiol (SRONYX) 0.1-20 MG-MCG tablet     methocarbamol (ROBAXIN) 500 MG tablet     No current facility-administered medications for this visit.        Review of Systems - History obtained from chart review and the patient  General ROS: positive for  - " "sleep disturbance, improving  Psychological ROS: positive for - cognitive deficits, frustrated mood  Respiratory ROS: no cough, shortness of breath, or wheezing  Cardiovascular ROS: no chest pain or dyspnea on exertion  Gastrointestinal ROS: no abdominal pain, change in bowel habits, or black or bloody stools  Genito-Urinary ROS: no dysuria, trouble voiding, or hematuria  Musculoskeletal ROS: positive for - Neck pain  Neurological ROS: positive for - HAs, cognitive deficits, dizziness  Dermatological ROS: negative for rash    Physical Exam:  /75   Pulse 92   Ht 1.6 m (5' 3\")   Wt 91.6 kg (202 lb)   LMP 04/29/2019   SpO2 100%   BMI 35.78 kg/m    Gen: NAD  Skin: No rashes noted  HEENT: Myofascial pain over upper and middle traps,  FROM to neck, pain with turning to the left  Pulm: Non-labored breathing  GI: Soft, NT/ND  Ext: No LE Edema, no calf tenderness  Neuro: AAOx3, follows commands, answers appropriately  MMT 5/5 to all extremities    Imaging:  CT Head (3/26/19)  IMPRESSION:  No CT evidence of acute intracranial process.  CT L-Spine (3/26/19)  IMPRESSION:  1. No acute or significant osseous findings confirmed.  XR R Shoulder (3/26/19)  IMPRESSION:  Mildly prominent AC joint questionable for grade 1 AC separation.  Negative for fracture or subluxation.  Assessment:  Susan Dowell is a 23 year old woman suffered a concussion on 3/25/19 due to a slip on the ice in a work related incident.  Has multiple symptoms, which are slightly improved, most predominant are dizziness and headaches.   Recommendations:  -Given her initial presentation with multiple and significant symptoms, I am happy to see that we are at least moving in th e right direction and she is seeing some improvements.  -Continue Neurontin 300 mg TID for HAs.  We discussed possibly titrating the dose up, however she feels it is helping and would like to keep the current dose  -Refill of Robaxin provided  -Discussed sleep hygiene and " recommended taking Melatonin 3-6 mg at night for sleep  -Continue with therapies  -Will continue to keep off of work for now, letter was provided  -RTC in 6 weeks for re-evaluation  Davonte Means MD  Department of Rehabilitation Medicine  Pager: [phone]  Time Spent on this Encounter   I, Davonte Means, spent a total of 30 minutes in the clinic today managing the care of Susan Dowell.  Over 50% of my time on the unit was spent counseling the patient and /or coordinating care.  See note for details.    Answers for HPI/ROS submitted by the patient on 5/15/2019   If you checked off any problems, how difficult have these problems made it for you to do your work, take care of things at home, or get along with other people?: Somewhat difficult  PHQ9 TOTAL SCORE: 9  PAT 7 TOTAL SCORE: 7

## 2019-05-15 NOTE — LETTER
To whom it may concern,    I was able to see Susan Dowell in the concussion clinic on 5/15/2019 at the [facility].  As you may be aware, she suffered a concussion on 3/25/19 while at work, and since that time has had significant symptoms related to the concussion.  Due to these symptoms, she would have a very difficult time completing her job duties and I would recommend not working at this time to allow for further healing and improvement.  We have started with therapies and some medications to assist with this and I will re-evaluate on   July 10, 2019.  Please feel free to contact me with any questions.      Sincerely,           Davonte Means MD  5/15/2019

## 2019-05-15 NOTE — NURSING NOTE
"Chief Complaint   Patient presents with     Head Injury     concussion w/o loc 3/25/2019= fall with posterior head strike       Vitals:    05/15/19 1048   BP: 115/75   Pulse: 92   SpO2: 100%   Weight: 91.6 kg (202 lb)   Height: 1.6 m (5' 3\")       Body mass index is 35.78 kg/m .    PAT-7 SCORE 4/17/2019 5/15/2019   Total Score 6 (mild anxiety) 7 (mild anxiety)   Total Score 6 7     PHQ-9 SCORE 4/17/2019 5/15/2019   PHQ-9 Total Score MyChart 7 (Mild depression) 9 (Mild depression)   PHQ-9 Total Score 7 9           CONCUSSION SYMPTOMS ASSESSMENT 4/30/2019 5/7/2019 5/15/2019   Headache or Pressure In Head 6 - excruciating 6 - excruciating 5 - severe   Upset Stomach or Throwing Up 6 - excruciating 5 - severe 4 - moderate to severe   Problems with Balance 6 - excruciating 4 - moderate to severe 4 - moderate to severe   Feeling Dizzy 6 - excruciating 5 - severe 5 - severe   Sensitivity to Light 6 - excruciating 6 - excruciating 5 - severe   Sensitivity to Noise 6 - excruciating 5 - severe 5 - severe   Mood Changes 5 - severe 4 - moderate to severe 4 - moderate to severe   Feeling sluggish, hazy, or foggy 6 - excruciating 4 - moderate to severe 4 - moderate to severe   Trouble Concentrating, Lack of Focus 6 - excruciating 5 - severe 4 - moderate to severe   Motion Sickness 5 - severe 4 - moderate to severe 3 - moderate   Vision Changes 6 - excruciating 4 - moderate to severe 3 - moderate   Memory Problems 6 - excruciating 5 - severe 4 - moderate to severe   Feeling Confused 6 - excruciating 5 - severe 4 - moderate to severe   Neck Pain 6 - excruciating 5 - severe 5 - severe   Trouble Sleeping 6 - excruciating 4 - moderate to severe 5 - severe   Total Number of Symptoms 15 15 15   Symptom Severity Score 88 71 64                   "

## 2019-05-16 ENCOUNTER — TELEPHONE (OUTPATIENT)
Dept: SURGERY | Facility: CLINIC | Age: 24
End: 2019-05-16

## 2019-05-16 ASSESSMENT — ANXIETY QUESTIONNAIRES: GAD7 TOTAL SCORE: 7

## 2019-05-21 ENCOUNTER — TELEPHONE (OUTPATIENT)
Dept: FAMILY MEDICINE | Facility: CLINIC | Age: 24
End: 2019-05-21

## 2019-05-21 NOTE — TELEPHONE ENCOUNTER
Reason for Call:  Other call back    Detailed comments: Nurse  spoke to patient regarding her WC issue.  wants specialist to deal with her work comp information and visits. Patient wanted to let you know what was going on and be aware that is why her appointments are cancelled per her nurse  - patient doesn't want to but per WC she has to with this injury -       Nurse  - Anu BEARD - [phone].     Phone Number Patient can be reached at: Other phone number:      Best Time: Any time if needed.     Can we leave a detailed message on this number? YES    Call taken on 5/21/2019 at 2:46 PM by Judit Puga

## 2019-05-22 ENCOUNTER — TELEPHONE (OUTPATIENT)
Dept: SURGERY | Facility: CLINIC | Age: 24
End: 2019-05-22

## 2019-05-23 ASSESSMENT — PATIENT HEALTH QUESTIONNAIRE - PHQ9: SUM OF ALL RESPONSES TO PHQ QUESTIONS 1-9: 9

## 2019-06-04 ENCOUNTER — HOSPITAL ENCOUNTER (OUTPATIENT)
Dept: PHYSICAL THERAPY | Facility: CLINIC | Age: 24
Setting detail: THERAPIES SERIES
End: 2019-06-04
Attending: PHYSICAL MEDICINE & REHABILITATION
Payer: OTHER MISCELLANEOUS

## 2019-06-04 PROCEDURE — 97112 NEUROMUSCULAR REEDUCATION: CPT | Mod: GP | Performed by: PHYSICAL THERAPIST

## 2019-06-04 PROCEDURE — 97110 THERAPEUTIC EXERCISES: CPT | Mod: GP | Performed by: PHYSICAL THERAPIST

## 2019-06-06 DIAGNOSIS — S06.0X1A CONCUSSION WITH LOSS OF CONSCIOUSNESS OF 30 MINUTES OR LESS, INITIAL ENCOUNTER: Primary | ICD-10-CM

## 2019-06-07 ENCOUNTER — HOSPITAL ENCOUNTER (OUTPATIENT)
Dept: PHYSICAL THERAPY | Facility: CLINIC | Age: 24
Setting detail: THERAPIES SERIES
End: 2019-06-07
Attending: PHYSICAL MEDICINE & REHABILITATION
Payer: OTHER MISCELLANEOUS

## 2019-06-07 PROCEDURE — 97140 MANUAL THERAPY 1/> REGIONS: CPT | Mod: GP | Performed by: PHYSICAL THERAPIST

## 2019-06-07 PROCEDURE — 97112 NEUROMUSCULAR REEDUCATION: CPT | Mod: GP | Performed by: PHYSICAL THERAPIST

## 2019-06-07 PROCEDURE — 97110 THERAPEUTIC EXERCISES: CPT | Mod: GP | Performed by: PHYSICAL THERAPIST

## 2019-06-10 ENCOUNTER — HOSPITAL ENCOUNTER (OUTPATIENT)
Dept: PHYSICAL THERAPY | Facility: CLINIC | Age: 24
Setting detail: THERAPIES SERIES
End: 2019-06-10
Attending: PHYSICAL MEDICINE & REHABILITATION
Payer: OTHER MISCELLANEOUS

## 2019-06-10 PROCEDURE — 97110 THERAPEUTIC EXERCISES: CPT | Mod: GP | Performed by: PHYSICAL THERAPIST

## 2019-06-10 PROCEDURE — 97140 MANUAL THERAPY 1/> REGIONS: CPT | Mod: GP | Performed by: PHYSICAL THERAPIST

## 2019-06-13 ENCOUNTER — HOSPITAL ENCOUNTER (OUTPATIENT)
Dept: SPEECH THERAPY | Facility: CLINIC | Age: 24
Setting detail: THERAPIES SERIES
End: 2019-06-13
Attending: PHYSICAL MEDICINE & REHABILITATION
Payer: OTHER MISCELLANEOUS

## 2019-06-13 ENCOUNTER — HOSPITAL ENCOUNTER (OUTPATIENT)
Dept: OCCUPATIONAL THERAPY | Facility: CLINIC | Age: 24
Setting detail: THERAPIES SERIES
End: 2019-06-13
Attending: PHYSICAL MEDICINE & REHABILITATION
Payer: OTHER MISCELLANEOUS

## 2019-06-13 DIAGNOSIS — S06.0X1A CONCUSSION WITH LOSS OF CONSCIOUSNESS OF 30 MINUTES OR LESS, INITIAL ENCOUNTER: ICD-10-CM

## 2019-06-13 PROCEDURE — 97167 OT EVAL HIGH COMPLEX 60 MIN: CPT | Mod: GO

## 2019-06-13 PROCEDURE — 97112 NEUROMUSCULAR REEDUCATION: CPT | Mod: GO

## 2019-06-13 PROCEDURE — 97535 SELF CARE MNGMENT TRAINING: CPT | Mod: GO

## 2019-06-13 PROCEDURE — 92523 SPEECH SOUND LANG COMPREHEN: CPT | Mod: GN | Performed by: SPEECH-LANGUAGE PATHOLOGIST

## 2019-06-17 ENCOUNTER — HOSPITAL ENCOUNTER (OUTPATIENT)
Dept: PHYSICAL THERAPY | Facility: CLINIC | Age: 24
Setting detail: THERAPIES SERIES
End: 2019-06-17
Attending: PHYSICAL MEDICINE & REHABILITATION
Payer: OTHER MISCELLANEOUS

## 2019-06-17 ENCOUNTER — HOSPITAL ENCOUNTER (OUTPATIENT)
Dept: OCCUPATIONAL THERAPY | Facility: CLINIC | Age: 24
Setting detail: THERAPIES SERIES
End: 2019-06-17
Attending: PHYSICAL MEDICINE & REHABILITATION
Payer: OTHER MISCELLANEOUS

## 2019-06-17 PROCEDURE — 97110 THERAPEUTIC EXERCISES: CPT | Mod: GP | Performed by: PHYSICAL THERAPIST

## 2019-06-17 PROCEDURE — 97112 NEUROMUSCULAR REEDUCATION: CPT | Mod: GO

## 2019-06-17 PROCEDURE — 97535 SELF CARE MNGMENT TRAINING: CPT | Mod: GO

## 2019-06-17 NOTE — ADDENDUM NOTE
Encounter addended by: Brandi Hinton, SLP on: 6/17/2019 5:21 PM   Actions taken: Flowsheet accepted, Sign clinical note

## 2019-06-17 NOTE — PROGRESS NOTES
Repeatable Battery for the Assessment of Neuropsychological Status Update   (RBANS  Update)  The Repeatable Battery for the Assessment of Neuropsychological Status Update (RBANS  Update) was administered to Susan Dowell on 6/12/2019.  The RBANS Update was originally developed with a primary focus on assessment of dementia, and measures cognitive decline or improvement across these domains: immediate memory, visuospatial/constructional; language; attention; and delayed memory.  However, special group studies are available for Alzheimer's Disease, Vascular Dementia, HIV Dementia, Burton's Disease, Parkinson's Disease, Depression, Schizophrenia, and Closed Head Injury.   The RBANS can be used by clinicians and neuropsychologists to help screen for deficits in acute-care settings; track recovery during rehabilitation; track progression of neurological disorders; and screen for neurocognitive status in adolescents.  This test is normed for ages 12-89.  The subtest normative data are presented in scaled score units that have a mean of 10 and a standard deviation of 3.          Total Score Scaled Score  Percentile Group       I.  Immediate memory    1.  List Learning   27  7   2.  Story Memory   20  10  Immediate Memory Index Score  = 90    II.  Visuospatial/Constructional    3.  Figure copy   18  6  4.  Line Orientation   16     26-50  Visuospatial/Constructional Index Score  = 84    III.  Language     5.  Picture Naming   10     51-75  6.  Semantic Fluency   10  1  Language Index Score = 74    IV.  Attention  7.  Digit Span     10  8  8.  Coding     52  8  Attention Index Score = 88    V.  Delayed Memory  9.  List recall    10     >75  10. List Recognition   20     51-75  11. Story Recall   11  11  12. Figure Recall   19  13  Delayed Memory Index Score = 112  Sum of Total Scores for Subtest 9+11+12 49    Sum of Index Scores = 448  Total Scale Score = 850      INTERPRETATION OF TEST RESULTS: Completed RBANS and  patient scored below average in Immediate Memory, Visuospatial, Language and Attention subtests.  Specific difficulties include list learning, story recall, word fluency, short term deficits and speed of processing.  Patient would benefit from skilled intervention to increse overall ability to care for herself and return to prior level of functioning.      TIME ADMINISTERING TEST: 40  TIME FOR INTERPRETATION AND PREPARATION OF REPORT: 25  TOTAL TIME: 65    Repeatable Battery for the Assessment of Neuropsychological Status Update (RBANS Update). Copyright   2012 [facility] Adapted and reproduced with permission. All rights reserved.

## 2019-06-19 ENCOUNTER — HOSPITAL ENCOUNTER (OUTPATIENT)
Dept: SPEECH THERAPY | Facility: CLINIC | Age: 24
Setting detail: THERAPIES SERIES
End: 2019-06-19
Attending: PHYSICAL MEDICINE & REHABILITATION
Payer: OTHER MISCELLANEOUS

## 2019-06-19 PROCEDURE — 92507 TX SP LANG VOICE COMM INDIV: CPT | Mod: GN | Performed by: SPEECH-LANGUAGE PATHOLOGIST

## 2019-06-19 NOTE — ADDENDUM NOTE
Encounter addended by: Raina Bhakta OT on: 6/19/2019 9:23 AM   Actions taken: Flowsheet data copied forward, Flowsheet accepted

## 2019-06-19 NOTE — ADDENDUM NOTE
Encounter addended by: Raina Bhakta OT on: 6/19/2019 3:33 PM   Actions taken: Sign clinical note, Flowsheet accepted

## 2019-06-19 NOTE — PROGRESS NOTES
06/13/19 0818   Quick Adds   Quick Adds Concussion   Type of Visit Initial Outpatient Occupational Therapy Evaluation   General Information   Start Of Care Date 06/13/19   Referring Physician Magdalena Means MD   Orders Evaluate and treat as indicated   Orders Date 06/06/19   Medical Diagnosis Concussion with loss of consciousness of 30 minutes or less, initial encounter (S06.0X1A)    Onset of Illness/Injury or Date of Surgery 03/25/19   Surgical/Medical History Reviewed Yes   Additional Occupational Profile Info/Pertinent History of Current Problem Patient is a 23 year old who sustained a concussion from a fall at work; slip on ice falling straight back with posterior head strike, shoulder an d back injury (negative for fracture/dislocation).  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  She lives in a house with  and roommates.     Role/Living Environment   Patient role/Employment history Employed   Current Living Environment House   Prior Level - Transfers Independent   Prior Level - Ambulation Independent   Prior Level - ADLS Independent   Prior Responsibilities - IADL Meal Preparation;Housekeeping;Shopping;Laundry;Yardwork;Medication management;Finances;Driving;Work   Patient/family Goals Statement 'I just want to get better, I am tired of the headaches'   Vision Interview   Technology Used Smart phone, audio book   Technology Use Increases Symptoms Yes   Technology Use Increases Symptoms Comments Does use a blue light filter   Do Glasses Help? Yes   Do Glasses Help Comments Yes does help   Type of Glasses Bifocal   Type of Glasses Comments Is legally blind if not wearing prescription glasses   Light Sensitivity/Glare  Indoor;Outdoor   Impaired Vision Blurred vision;Double vision;Impaired accommodation   Brings Print Close to Read Yes    Unable to Read Small Print yes   Reported Reading Speed Slowed   Reading Endurance/Fatigue   Complaints of Visual Fatigue Comments Yes  "  Convergence Abnormal  (30 cm)   Convergence Comments worth 4 dot positive for diplopia    Pursuits Abnormal   Pursuits Comments 'jumping', frequent blinking, would go back to center if too straining on eyes x 3.   Pupillary Function Normal   Additional Comments Rubbing eyes, watering, and rapid blinking at end of demand   Difficulties with IADL Performance: Increase in Symptoms with the Following   Difficulty Concentrating at School, Work or Home Yes   Difficulty Multi-Tasking/Planning Yes   Busy/Dynamic Environments Has removed self from because increase in symptoms   Pathfinding in Busy Environments Yes; had decreased driving due to increased symptoms in busy environment   Sensory Tolerance A litte bit more sensitive   Startles Easily Yes, \"I don't like it\"   Mood Changes   Is Patient Experiencing Changes in Mood? Feeling irritable;Anxiety;Other (see comments);Depression  (Restless)   Patient Mood Comments Patient reports that increasing frustration and then increases depression   Is Patient Currently Receiving Treatment for Mental Health? No   Vestibular Symptoms   Is Patient Experiencing Vestibular Symptoms? Yes   Fatigue   General Fatigue ADL;Work   Pain   Pain comments See CSA flowsheet   Fall Risk Screen   Fall screen completed by PT   Is patient a fall risk? No   Cognitive Status Examination   Orientation Orientation to person, place and time   Level of Consciousness Alert   Follows Commands and Answers Questions 75% of the time;Unable to follow multi-step instructions   Personal Safety and Judgment Intact   Memory Intact   Attention Distractible during evaluation;Quiet environment required   Organization/Problem Solving Reports problems with organization;Reports problems with problem solving during evaluation   Executive Function Planning ability impaired;Cognitive flexibility impaired;Working memory impaired, decreased storage of information for performing tasks   Cognitive Comment Cognistat started  "   Visual Perception   Visual Perception Wears glasses   Visual Perception Comments Reports she has an upcoming eye doctor appointment   Range of Motion (ROM)   ROM Quick Adds Shoulder, Right   ROM Comments Report should injury sustained as well; trying to manage concussion then will address shoulder pain.  X-ray negative for fracture or dislocation;    Coordination   Coordination Comments Will test next visit; due to time constraint   Planned Therapy Interventions   Planned Therapy Interventions Cognitive performance testing;Coordination training;Neuromuscular re-education;Self care/Home management;Therapeutic activities;Visual perception   Adult OT Eval Goals   OT Eval Goals (Adult) 1;2;3;4    OT Goal 1   Goal Identifier Accommodations   Goal Description Patient will demonstrate at least 4 visual fatigue/sound accommodations recommendations for symptom management to improve ability to complete IADL's for continued safe and meaningful participate in daily life during this reporting period.   Target Date 09/06/19    OT Goal 2   Goal Identifier Visual processing   Goal Description Patient will improve convergence insufficiency by 5 cm to increase ability to focus on objects and strengthen eye ability to improve work performance and decrease in concussion symptoms this reporting period.     Target Date 09/06/19    OT Goal 3   Goal Identifier Mutli-step directions   Goal Description Patient will be able to follow 4 step directions for improve cognitive ability demonstrating progress and ability to safely participate in IADL such as work and driving to her highest potential.     Target Date 09/06/19   OT Goal 4   Goal Identifier Divided attention   Goal Description Patient will complete 3 # sequence and 60+ light hits as tested by [employer] with no increase in symptoms (dizziness, headache, etc), in order to improve skills needed (peripheral vision, divided attention, and reaction speed) for safety with IADL, driving,  and work task.    Target Date 09/06/19   Clinical Impression   Criteria for Skilled Therapeutic Interventions Met Yes, treatment indicated   OT Diagnosis Decreased functional ability to complete ADL/IADL, work, leisure, and driving tasks safely   Influenced by the following impairments post concussive symptoms.    Assessment of Occupational Performance 5 or more Performance Deficits   Identified Performance Deficits work, IADL, driving, relationships; leisure   Clinical Decision Making (Complexity) High complexity   Therapy Frequency 2x/week   Predicted Duration of Therapy Intervention (days/wks) 12 weeks   Risks and Benefits of Treatment have been explained. Yes   Patient, Family & other staff in agreement with plan of care Yes   Total Evaluation Time   OT Eval, High Complexity Minutes (02378) 36     Thank you for the referral to Occupational Therapy!    DANY Finn/L  [facility]  [phone]

## 2019-06-21 ENCOUNTER — HOSPITAL ENCOUNTER (OUTPATIENT)
Dept: PHYSICAL THERAPY | Facility: CLINIC | Age: 24
Setting detail: THERAPIES SERIES
End: 2019-06-21
Attending: PHYSICAL MEDICINE & REHABILITATION
Payer: OTHER MISCELLANEOUS

## 2019-06-21 PROCEDURE — 97110 THERAPEUTIC EXERCISES: CPT | Mod: GP | Performed by: PHYSICAL THERAPIST

## 2019-06-25 ENCOUNTER — HOSPITAL ENCOUNTER (OUTPATIENT)
Dept: PHYSICAL THERAPY | Facility: CLINIC | Age: 24
Setting detail: THERAPIES SERIES
End: 2019-06-25
Attending: PHYSICAL MEDICINE & REHABILITATION
Payer: OTHER MISCELLANEOUS

## 2019-06-25 PROCEDURE — 97140 MANUAL THERAPY 1/> REGIONS: CPT | Mod: GP | Performed by: PHYSICAL THERAPIST

## 2019-06-25 PROCEDURE — 97110 THERAPEUTIC EXERCISES: CPT | Mod: GP | Performed by: PHYSICAL THERAPIST

## 2019-06-25 NOTE — PROGRESS NOTES
"SUBJECTIVE:  Susan Dowell is a 23 year old female who is seen as self referral for right shoulder injury that occurred that started/occurred  On 3/25/2019,  3 months ago in a work described fall slipped landed on back hit head, and thinks arm may have been behind her, but isn't sure.. Initial evaluation was thru [facility] ED in [facility].   Cause: evaluation of a fall injury. Patient was walking down some stairs and she slipped.     Present symptoms: pain with range of motion of shoulder, pain lifting, or backpack on shoulder.  Feels weak.  She feels pain in the right AC joint and posterior.  Feels popping  Making some progress with pain    Associated symptoms: neck pain, radiating pain to wrist, numbness/tingling ulnar 3 fingers, which seem to the patient to be related to the shoulder symptoms     Treatment up to this point:Tylenol, NSAIDS, physical therapy, but only recently for shoulder.  Ice.  Neurontin.   Has been seeing PMR at the  for headaches, neck and shoulder pain.  Latest note states \"shoulders improved\".  Prior history of related problems:    Significant Orthopedic past medical history: she had an MRI of the RIGHT shoulder in July, 2018, for a different injury that was normal      Past Medical History        Past Medical History:   Diagnosis Date     Acne       Headache(784.0)       Unspecified chronic suppurative otitis media       Recurrent left otitis            Past Surgical History         Past Surgical History:   Procedure Laterality Date     ESOPHAGOSCOPY, GASTROSCOPY, DUODENOSCOPY (EGD), COMBINED N/A 8/31/2016     Procedure: COMBINED ESOPHAGOSCOPY, GASTROSCOPY, DUODENOSCOPY (EGD), BIOPSY SINGLE OR MULTIPLE;  Surgeon: Jacob Gonzales MD;  Location:  GI     HC CREATE EARDRUM OPENING,GEN ANESTH   1997     P.E. Tubes lt     HC CREATE EARDRUM OPENING,GEN ANESTH   04/11/2005     Left myringotomy with PE tube placement.            REVIEW OF SYSTEMS:  CONSTITUTIONAL:  NEGATIVE for fever, " "chills, change in weight  INTEGUMENTARY/SKIN:  NEGATIVE for worrisome rashes, moles or lesions  EYES:  NEGATIVE for vision changes or irritation  ENT/MOUTH:  NEGATIVE for ear, mouth and throat problems  RESP:  NEGATIVE for significant cough or SOB  BREAST:  NEGATIVE for masses, tenderness or discharge  CV:  NEGATIVE for chest pain, palpitations or peripheral edema  GI:  NEGATIVE for nausea, abdominal pain, heartburn, or change in bowel habits  :  Negative   MUSCULOSKELETAL:  See HPI above  NEURO:  POSITIVE for headaches from the injury, see HPI  ENDOCRINE:  NEGATIVE for temperature intolerance, skin/hair changes  HEME/ALLERGY/IMMUNE:  NEGATIVE for bleeding problems  PSYCHIATRIC:  NEGATIVE for changes in mood or affect     OBJECTIVE:  /62 (BP Location: Right arm, Patient Position: Sitting, Cuff Size: Adult Large)   Ht 1.6 m (5' 3\")   Wt 91.6 kg (202 lb)   BMI 35.78 kg/m        GENERAL: healthy, alert and no distress  GAIT: normal   SKIN: no suspicious lesions or rashes  NEURO: Normal strength and tone, mentation intact and speech normal  VASCULAR:  normal pulses and cappillary refill   PSYCH:  mentation appears normal and affect normal/bright     MUSCULOSKELETAL:     NECK:  Cervical range of motion: full,  causes pain in neck, reproduces shoulder pain, causes pain down arm to hand, when looks AWAY from right   Sensory deficits:  none  Motor deficits:  none  DTR's:  normal     SHOULDER:  Shoulder Inspection: no swelling, bruising, discoloration, or obvious deformity or asymmetry  no atrophy  scapular winging: negative  Distal clavicle not prominent.  Tender: diffuse    Range of Motion:              Active:forward flexion 140* degrees, internal rotation  L1              Passive: forward flexion 140* degrees, ER 60.  Some AC crepitations  Impingement: all grade 2 positive  Strength: forward flexion 5/5, External rotation 5/5  Liftoff: Able      Special tests: Sulcus: 0  Speed: +/-  Anterior Drawer: " 1  Posterior Drawer: 1  [place]urling's: Negative  [place] (SLAP/biceps):  Positive     X-RAY INTERPRETATION  Apparently had a right shoulder xray, 3/26/19.  PMR note states that xray was questionable for grade 1 AC separation.  I do not see these xrays.     MRI:    From 7/19/18: (well prior to the injury):  Normal.  No tears.     ASSESSMENT  No diagnosis found.  Rotator cuff seems intact on exam.  Other, more subtle pathology could be present but I think this pain is coming from other sources.  Possible grade 1 AC separation, but I would expect this to have healed by now.  Possible brachial plexus stretch injury without nerve damage, vs cervical origin..     PLAN:   referral to physical therapy for shoulder and neck.  If not better in 2-3 months, consider shoulder MRI.        MOE Wolf MD  Dept. Orthopedic Surgery  [facility]

## 2019-06-26 ENCOUNTER — HOSPITAL ENCOUNTER (OUTPATIENT)
Dept: SPEECH THERAPY | Facility: CLINIC | Age: 24
Setting detail: THERAPIES SERIES
End: 2019-06-26
Attending: PHYSICAL MEDICINE & REHABILITATION
Payer: OTHER MISCELLANEOUS

## 2019-06-26 ENCOUNTER — OFFICE VISIT (OUTPATIENT)
Dept: ORTHOPEDICS | Facility: OTHER | Age: 24
End: 2019-06-26
Payer: OTHER MISCELLANEOUS

## 2019-06-26 VITALS
BODY MASS INDEX: 35.79 KG/M2 | SYSTOLIC BLOOD PRESSURE: 106 MMHG | DIASTOLIC BLOOD PRESSURE: 62 MMHG | WEIGHT: 202 LBS | HEIGHT: 63 IN

## 2019-06-26 DIAGNOSIS — M25.511 CHRONIC RIGHT SHOULDER PAIN: Primary | ICD-10-CM

## 2019-06-26 DIAGNOSIS — G89.29 CHRONIC RIGHT SHOULDER PAIN: Primary | ICD-10-CM

## 2019-06-26 PROCEDURE — 92507 TX SP LANG VOICE COMM INDIV: CPT | Mod: GN | Performed by: SPEECH-LANGUAGE PATHOLOGIST

## 2019-06-26 PROCEDURE — 99203 OFFICE O/P NEW LOW 30 MIN: CPT | Performed by: ORTHOPAEDIC SURGERY

## 2019-06-26 ASSESSMENT — MIFFLIN-ST. JEOR: SCORE: 1635.4

## 2019-06-26 NOTE — LETTER
"    6/26/2019         RE: Susan Dowell  [address]    [place] [address]        Dear Colleague,    Thank you for referring your patient, Susan Dowell, to the [facility]. Please see a copy of my visit note below.    SUBJECTIVE:  Susan Dowell is a 23 year old female who is seen as self referral for right shoulder injury that occurred that started/occurred  On 3/25/2019,   3 months ago in a work described fall slipped landed on back hit head, and thinks arm may have been behind her, but isn't sure.. Initial evaluation was thru [facility] ED in [facility].   Cause: evaluation of a fall injury. Patient was walking down some stairs and she slipped.     Present symptoms: pain with range of motion of shoulder, pain lifting, or backpack on shoulder.  Feels weak.  She feels pain in the right AC joint and posterior.  Feels popping  Making some progress with pain    Associated symptoms: neck pain, radiating pain to wrist, numbness/tingling ulnar 3 fingers, which seem to the patient to be related to the shoulder symptoms     Treatment up to this point:Tylenol, NSAIDS, physical therapy, but only recently for shoulder.  Ice.  Neurontin.   Has been seeing PMR at the  for headaches, neck and shoulder pain.  Latest note states \"shoulders improved\".  Prior history of related problems:    Significant Orthopedic past medical history: she had an MRI of the RIGHT shoulder in July, 2018, for a different injury that was normal      Past Medical History        Past Medical History:   Diagnosis Date     Acne       Headache(784.0)       Unspecified chronic suppurative otitis media       Recurrent left otitis            Past Surgical History         Past Surgical History:   Procedure Laterality Date     ESOPHAGOSCOPY, GASTROSCOPY, DUODENOSCOPY (EGD), COMBINED N/A 8/31/2016     Procedure: COMBINED ESOPHAGOSCOPY, GASTROSCOPY, DUODENOSCOPY (EGD), BIOPSY SINGLE OR MULTIPLE;  Surgeon: " "Jacob Gonzales MD;  Location:  GI     HC CREATE EARDRUM OPENING,GEN ANESTH   1997     P.E. Tubes lt     HC CREATE EARDRUM OPENING,GEN ANESTH   04/11/2005     Left myringotomy with PE tube placement.            REVIEW OF SYSTEMS:  CONSTITUTIONAL:   NEGATIVE for fever, chills, change in weight  INTEGUMENTARY/SKIN:   NEGATIVE for worrisome rashes, moles or lesions  EYES:   NEGATIVE for vision changes or irritation  ENT/MOUTH:   NEGATIVE for ear, mouth and throat problems  RESP:   NEGATIVE for significant cough or SOB  BREAST:   NEGATIVE for masses, tenderness or discharge  CV:   NEGATIVE for chest pain, palpitations or peripheral edema  GI:   NEGATIVE for nausea, abdominal pain, heartburn, or change in bowel habits  :  Negative   MUSCULOSKELETAL:  See HPI above  NEURO:   POSITIVE for headaches from the injury, see HPI  ENDOCRINE:   NEGATIVE for temperature intolerance, skin/hair changes  HEME/ALLERGY/IMMUNE:   NEGATIVE for bleeding problems  PSYCHIATRIC:   NEGATIVE for changes in mood or affect     OBJECTIVE:  /62 (BP Location: Right arm, Patient Position: Sitting, Cuff Size: Adult Large)   Ht 1.6 m (5' 3\")   Wt 91.6 kg (202 lb)   BMI 35.78 kg/m         GENERAL: healthy, alert and no distress  GAIT: normal   SKIN: no suspicious lesions or rashes  NEURO: Normal strength and tone, mentation intact and speech normal  VASCULAR:  normal pulses and cappillary refill   PSYCH:  mentation appears normal and affect normal/bright     MUSCULOSKELETAL:     NECK:  Cervical range of motion: full,   causes pain in neck, reproduces shoulder pain, causes pain down arm to hand, when looks AWAY from right   Sensory deficits:   none  Motor deficits:   none  DTR's:   normal     SHOULDER:  Shoulder Inspection: no swelling, bruising, discoloration, or obvious deformity or asymmetry  no atrophy  scapular winging: negative  Distal clavicle not prominent.  Tender: diffuse    Range of Motion:              Active: forward flexion " 140* degrees, internal rotation  L1              Passive:  forward flexion 140* degrees, ER 60.  Some AC crepitations  Impingement: all grade 2 positive  Strength: forward flexion 5/5, External rotation 5/5  Liftoff:  Able      Special tests: Sulcus: 0  Speed: +/-  Anterior Drawer: 1  Posterior Drawer: 1  Spurling's: Negative  [place] (SLAP/biceps):  Positive     X-RAY INTERPRETATION  Apparently had a right shoulder xray, 3/26/19.  PMR note states that xray was questionable for grade 1 AC separation.  I do not see these xrays.     MRI:    From 7/19/18: (well prior to the injury):  Normal.  No tears.     ASSESSMENT  No diagnosis found.  Rotator cuff seems intact on exam.  Other, more subtle pathology could be present but I think this pain is coming from other sources.  Possible grade 1 AC separation, but I would expect this to have healed by now.  Possible brachial plexus stretch injury without nerve damage, vs cervical origin..     PLAN:   referral to physical therapy for shoulder and neck.  If not better in 2-3 months, consider shoulder MRI.        MOE Wolf MD  Dept. Orthopedic Surgery  [facility]      Again, thank you for allowing me to participate in the care of your patient.        Sincerely,        Micheal Wolf MD

## 2019-07-03 ENCOUNTER — HOSPITAL ENCOUNTER (OUTPATIENT)
Dept: PHYSICAL THERAPY | Facility: CLINIC | Age: 24
Setting detail: THERAPIES SERIES
End: 2019-07-03
Attending: PHYSICAL MEDICINE & REHABILITATION
Payer: OTHER MISCELLANEOUS

## 2019-07-03 ENCOUNTER — HOSPITAL ENCOUNTER (OUTPATIENT)
Dept: SPEECH THERAPY | Facility: CLINIC | Age: 24
Setting detail: THERAPIES SERIES
End: 2019-07-03
Attending: PHYSICAL MEDICINE & REHABILITATION
Payer: OTHER MISCELLANEOUS

## 2019-07-03 PROCEDURE — 97110 THERAPEUTIC EXERCISES: CPT | Mod: GP | Performed by: PHYSICAL THERAPIST

## 2019-07-03 PROCEDURE — 97112 NEUROMUSCULAR REEDUCATION: CPT | Mod: GP | Performed by: PHYSICAL THERAPIST

## 2019-07-03 PROCEDURE — 92507 TX SP LANG VOICE COMM INDIV: CPT | Mod: GN | Performed by: SPEECH-LANGUAGE PATHOLOGIST

## 2019-07-09 ENCOUNTER — TELEPHONE (OUTPATIENT)
Dept: SURGERY | Facility: CLINIC | Age: 24
End: 2019-07-09

## 2019-07-09 ENCOUNTER — HOSPITAL ENCOUNTER (OUTPATIENT)
Dept: PHYSICAL THERAPY | Facility: CLINIC | Age: 24
Setting detail: THERAPIES SERIES
End: 2019-07-09
Attending: PHYSICAL MEDICINE & REHABILITATION
Payer: OTHER MISCELLANEOUS

## 2019-07-09 PROCEDURE — 97530 THERAPEUTIC ACTIVITIES: CPT | Mod: GP | Performed by: PHYSICAL THERAPIST

## 2019-07-09 PROCEDURE — 97110 THERAPEUTIC EXERCISES: CPT | Mod: GP | Performed by: PHYSICAL THERAPIST

## 2019-07-09 PROCEDURE — 97140 MANUAL THERAPY 1/> REGIONS: CPT | Mod: GP | Performed by: PHYSICAL THERAPIST

## 2019-07-09 NOTE — TELEPHONE ENCOUNTER
Patient called to cancel f/u  concussion appointment. Nurse  of Work Comp case advised patient to cancel appointment as Insurance has refused to authorize the appointment until they receive the [facility] report. Patient encouraged to call Nurse  and discuss the plan for continued care.

## 2019-07-26 ENCOUNTER — HOSPITAL ENCOUNTER (OUTPATIENT)
Dept: PHYSICAL THERAPY | Facility: CLINIC | Age: 24
Setting detail: THERAPIES SERIES
End: 2019-07-26
Attending: PHYSICAL MEDICINE & REHABILITATION
Payer: OTHER MISCELLANEOUS

## 2019-07-26 PROCEDURE — 97110 THERAPEUTIC EXERCISES: CPT | Mod: GP | Performed by: PHYSICAL THERAPIST

## 2019-08-06 NOTE — PROGRESS NOTES
SUBJECTIVE:  Susan Dowell is a 23 year old female who is seen in follow-up for right shoulder injury: brachial plexus stretch injury  Rotator cuff seems intact on exam.  Other, more subtle pathology could be present but I think this pain is coming from other sources.  Possible grade 1 AC separation, but I would expect this to have healed by now.  Possible brachial plexus stretch injury without nerve damage, vs cervical origin.    Significant Orthopedic past medical history: she had an MRI of the RIGHT shoulder in July, 2018, for a different injury that was normal      physical therapy was ordered: Doing therapy/exercises: (y/n): yes.    Symptoms have been improving, slowly since last visit.  Present symptoms: pain in neck down through shoulder to hand  numbness/tingling ulnar 3 digits.    GENERAL: healthy, alert and no distress  GAIT: normal   SKIN: no suspicious lesions or rashes  NEURO: Normal strength and tone, mentation intact and speech normal  VASCULAR:  normal pulses and cappillary refill   PSYCH:  mentation appears normal and affect normal/bright    SHOULDER:  Shoulder Inspection: no swelling, bruising, discoloration, or obvious deformity or asymmetry  no atrophy  scapular winging: negative  Tender: diffuse around shoulder and shoulder girdle and neck    Range of Motion:   Active:all normal.  Hyper-external rotation   Passive: all normal, crepitations mild, occasional  Strength: forward flexion 5/5, External rotation 5/5  Liftoff: Able, painful  Impingement: all grade 2 positive  Special tests: Sulcus: 1  Apprehension:  painful, but relocation/anterior pressure is more painful.  [place]'s: Negative  Speed: Negative  Anterior Drawer: 1  Posterior Drawer: 1  Spurling's: Positive    Cervical spine x-rays taken today and reviewed with the patient demonstrate loss of the normal cervical lordosis, but otherwise normal    Impression: brachial plexus neuritis/stretch injury.  There is a possibility of some  anterior labral problems in the shoulder, but is not the origin of the main pain, which is the neck and neurologic pains in shoulder and arm.  She does not have gross instability of the shoulder.    PLAN:   Neurology consult  Continue physical therapy  Consider shoulder MRI-Arthrogram for completeness and documentation of pathology, but is not clinically needed at this time.  I would not do surgery on her with her neurologic pains anyway, until resolved.  Could consider a glenohumeral joint injection down the road if the shoulder pains predominate.    Follow up 3 months or as needed .      MOE Wolf MD  Dept. Orthopedic Surgery  [facility]

## 2019-08-07 ENCOUNTER — ANCILLARY PROCEDURE (OUTPATIENT)
Dept: GENERAL RADIOLOGY | Facility: OTHER | Age: 24
End: 2019-08-07
Attending: ORTHOPAEDIC SURGERY
Payer: OTHER MISCELLANEOUS

## 2019-08-07 ENCOUNTER — OFFICE VISIT (OUTPATIENT)
Dept: ORTHOPEDICS | Facility: OTHER | Age: 24
End: 2019-08-07
Payer: OTHER MISCELLANEOUS

## 2019-08-07 VITALS
HEIGHT: 63 IN | WEIGHT: 202 LBS | BODY MASS INDEX: 35.79 KG/M2 | DIASTOLIC BLOOD PRESSURE: 62 MMHG | SYSTOLIC BLOOD PRESSURE: 128 MMHG

## 2019-08-07 DIAGNOSIS — Z02.6 ENCOUNTER RELATED TO WORKER'S COMPENSATION CLAIM: ICD-10-CM

## 2019-08-07 DIAGNOSIS — M25.511 CHRONIC RIGHT SHOULDER PAIN: Primary | ICD-10-CM

## 2019-08-07 DIAGNOSIS — G89.29 CHRONIC RIGHT SHOULDER PAIN: ICD-10-CM

## 2019-08-07 DIAGNOSIS — M25.511 CHRONIC RIGHT SHOULDER PAIN: ICD-10-CM

## 2019-08-07 DIAGNOSIS — G89.29 CHRONIC RIGHT SHOULDER PAIN: Primary | ICD-10-CM

## 2019-08-07 PROCEDURE — 99213 OFFICE O/P EST LOW 20 MIN: CPT | Performed by: ORTHOPAEDIC SURGERY

## 2019-08-07 PROCEDURE — 72040 X-RAY EXAM NECK SPINE 2-3 VW: CPT

## 2019-08-07 ASSESSMENT — MIFFLIN-ST. JEOR: SCORE: 1635.4

## 2019-08-07 NOTE — LETTER
8/7/2019         RE: Susan Dowell  [address]    [place] [address]        Dear Colleague,    Thank you for referring your patient, Susan Dowell, to the [facility]. Please see a copy of my visit note below.    SUBJECTIVE:  Susan Dowell is a 23 year old female who is seen in follow-up for right shoulder injury: brachial plexus stretch injury  Rotator cuff seems intact on exam.  Other, more subtle pathology could be present but I think this pain is coming from other sources.  Possible grade 1 AC separation, but I would expect this to have healed by now.  Possible brachial plexus stretch injury without nerve damage, vs cervical origin.    Significant Orthopedic past medical history: she had an MRI of the RIGHT shoulder in July, 2018, for a different injury that was normal      physical therapy was ordered: Doing therapy/exercises: (y/n): yes.    Symptoms have been improving, slowly since last visit.  Present symptoms: pain in neck down through shoulder to hand  numbness/tingling ulnar 3 digits.    GENERAL: healthy, alert and no distress  GAIT: normal   SKIN: no suspicious lesions or rashes  NEURO: Normal strength and tone, mentation intact and speech normal  VASCULAR:  normal pulses and cappillary refill   PSYCH:  mentation appears normal and affect normal/bright    SHOULDER:  Shoulder Inspection: no swelling, bruising, discoloration, or obvious deformity or asymmetry  no atrophy  scapular winging: negative  Tender: diffuse around shoulder and shoulder girdle and neck    Range of Motion:   Active:all normal.  Hyper-external rotation   Passive: all normal, crepitations mild, occasional  Strength: forward flexion 5/5, External rotation 5/5  Liftoff: Able, painful  Impingement: all grade 2 positive  Special tests: Sulcus: 1  Apprehension:  painful, but relocation/anterior pressure is more painful.  [place]'s: Negative  Speed: Negative  Anterior Drawer:  1  Posterior Drawer: 1  Spurling's: Positive    Cervical spine x-rays taken today and reviewed with the patient demonstrate loss of the normal cervical lordosis, but otherwise normal    Impression: brachial plexus neuritis/stretch injury.  There is a possibility of some anterior labral problems in the shoulder, but is not the origin of the main pain, which is the neck and neurologic pains in shoulder and arm.  She does not have gross instability of the shoulder.    PLAN:   Neurology consult  Continue physical therapy  Consider shoulder MRI-Arthrogram for completeness and documentation of pathology, but is not clinically needed at this time.  I would not do surgery on her with her neurologic pains anyway, until resolved.  Could consider a glenohumeral joint injection down the road if the shoulder pains predominate.    Follow up 3 months or as needed .      MOE Wolf MD  Dept. Orthopedic Surgery  [facility]                  Again, thank you for allowing me to participate in the care of your patient.        Sincerely,        Micheal Wolf MD

## 2019-08-13 ENCOUNTER — TELEPHONE (OUTPATIENT)
Dept: ORTHOPEDICS | Facility: CLINIC | Age: 24
End: 2019-08-13

## 2019-08-13 NOTE — TELEPHONE ENCOUNTER
Paloma stated she is waiting for her nurse  and third party insurer to coordinate any future Neurology evaluation.  She had no other concerns or requests from Dr Wolf department but will call if questions. Abdulaziz TERRAZAS

## 2019-08-15 ENCOUNTER — TELEPHONE (OUTPATIENT)
Dept: OCCUPATIONAL THERAPY | Facility: CLINIC | Age: 24
End: 2019-08-15

## 2019-08-15 NOTE — TELEPHONE ENCOUNTER
Patient had OT appointment on 8/13/19 she did not show to and today 8/15/19 9:00.  Left message to call back. She has been waiting for approval from work comp and was canceling appointments previous weeks.       DANY Finn/L  [facility]  [phone]

## 2019-10-03 ENCOUNTER — TELEPHONE (OUTPATIENT)
Dept: FAMILY MEDICINE | Facility: CLINIC | Age: 24
End: 2019-10-03

## 2019-10-03 NOTE — TELEPHONE ENCOUNTER
Reason for Call:  Same Day Appointment, Requested Provider:  Mercedes Jewell MD    PCP: Mercedes Jewell    Reason for visit: work comp re ck, referrals     Duration of symptoms: NA     Have you been treated for this in the past? Yes    Additional comments: Pt finally got approval to see Neurology. She is hoping you can her for that referral and she needs a new note for work. Best days would be 10/18, 11/5 & 11/11. Patient is aware that PCP is not in the office and is ok with waiting for their return before hearing anything back.     Can we leave a detailed message on this number? YES    Phone number patient can be reached at: Home number on file [phone] (home)    Best Time: any     Call taken on 10/3/2019 at 11:51 AM by Afshan Downey

## 2019-10-03 NOTE — TELEPHONE ENCOUNTER
Her [employer] calls and states the 18th works great for him to come with her. His number is [phone] and his name is Yohan Mayer.

## 2019-10-04 ENCOUNTER — TELEPHONE (OUTPATIENT)
Dept: FAMILY MEDICINE | Facility: CLINIC | Age: 24
End: 2019-10-04

## 2019-10-04 ENCOUNTER — TELEPHONE (OUTPATIENT)
Dept: SURGERY | Facility: CLINIC | Age: 24
End: 2019-10-04

## 2019-10-04 NOTE — TELEPHONE ENCOUNTER
Patient called crying uncontrollably. Voices frustration that she just found out her work comp Insurance carrier stopped paying her wages two weeks ago.She would like to come back to the clinic to be seen and get a new work note. She does not feel she is ready to return to work because of shoulder, neck and back pain. She also reports difficulty managing her headaches and anxiety. Encouraged patient to see Primary Provider first since she has not been seen by our clinic since May.Patient in agreement with the plan.

## 2019-10-04 NOTE — TELEPHONE ENCOUNTER
Reason for Call:  Same Day Appointment, Requested Provider:  Mercedes Jewell MD    PCP: Mercedes Jewell    Reason for visit: increase of symptoms of what was seen for previously    Duration of symptoms: ongoing    Have you been treated for this in the past? Yes    Additional comments: patient has an appointment on 11/15, feels she needs to be seen much sooner    Can we leave a detailed message on this number? YES    Phone number patient can be reached at: Cell number on file:    Telephone Information:   Mobile [phone]       Best Time:     Call taken on 10/4/2019 at 4:41 PM by Emilie Jorgensen

## 2019-10-06 ENCOUNTER — TELEPHONE (OUTPATIENT)
Dept: FAMILY MEDICINE | Facility: CLINIC | Age: 24
End: 2019-10-06

## 2019-10-06 NOTE — TELEPHONE ENCOUNTER
Reason for call: Per patient: Is there anyway I can be squeezed into your schedule this week for an updated work restrictuon letter?    Phone number to reach patient:  Cell number on file:    Telephone Information:   Mobile [phone]       Best Time:  Anytime    Can we leave a detailed message on this number?  YES

## 2019-10-07 ENCOUNTER — TELEPHONE (OUTPATIENT)
Dept: FAMILY MEDICINE | Facility: CLINIC | Age: 24
End: 2019-10-07

## 2019-10-07 NOTE — TELEPHONE ENCOUNTER
Reason for Call:  Other letter for work    Detailed comments: Paloma is asking if Dr Jewell is willing to write her a note for work to release her until she see's Dr Jewell on 10-25-19 or if she should see Dr Casey in [place] today at 3:40 pm to obtain a work note. Please advise.    Phone Number Patient can be reached at: Home number on file [phone] (home)    Best Time:     Can we leave a detailed message on this number? YES    Call taken on 10/7/2019 at 11:11 AM by Sofia Baltazar

## 2019-10-07 NOTE — TELEPHONE ENCOUNTER
Patient is going to see Dr. Casey and get a work note today.    Yokasta Galvez CMA ([facility])

## 2019-10-10 ENCOUNTER — TELEPHONE (OUTPATIENT)
Dept: FAMILY MEDICINE | Facility: CLINIC | Age: 24
End: 2019-10-10

## 2019-10-10 NOTE — TELEPHONE ENCOUNTER
Left a detailed message on patients voice mail regarding her appointment that is scheduled for tomorrow with Cristal.  That we have concerns regarding her needing work notes extended and that she has been seeing her PCP for this.  She should follow up with her PCP if needing notes/forms/letters/etc for work comp as this is a long standing issue.    Christiana Alfaro RN

## 2019-10-10 NOTE — TELEPHONE ENCOUNTER
----- Message from JEFF Josue CNP sent at 10/9/2019  3:59 PM CDT -----  Regarding: pt's appt  Can you please call her and find out more about this appt.  It looks like this is a long standing? Work comp issue.  Lots of notes about her calling in and needing work notes extended.  She has been seeing someone in [place], but lives in [place]?  Very likely she should be seeing her PCP for this.      Cristal Razo CNP

## 2019-11-01 NOTE — ADDENDUM NOTE
Encounter addended by: Brandi Hinton, SLP on: 11/1/2019 4:44 PM   Actions taken: Clinical Note Signed, Episode resolved

## 2019-11-06 ENCOUNTER — HEALTH MAINTENANCE LETTER (OUTPATIENT)
Age: 24
End: 2019-11-06

## 2020-03-23 NOTE — PROGRESS NOTES
Outpatient Occupational Therapy Discharge Note     Patient: Susan Dowell  : 1995    Beginning/End Dates of Reporting Period:  2019 to 3/23/2020    Referring Provider: Magdalena Means MD    Therapy Diagnosis: Decreased functional ability to complete ADL/IADL, work, leisure, and driving tasks safely    Goals:   Adult OT Eval Goals   OT Eval Goals (Adult) 1;2;3;4    OT Goal 1   Goal Identifier Accommodations   Goal Description Patient will demonstrate at least 4 visual fatigue/sound accommodations recommendations for symptom management to improve ability to complete IADL's for continued safe and meaningful participate in daily life during this reporting period.   Target Date 19    OT Goal 2   Goal Identifier Visual processing   Goal Description Patient will improve convergence insufficiency by 5 cm to increase ability to focus on objects and strengthen eye ability to improve work performance and decrease in concussion symptoms this reporting period.     Target Date 19    OT Goal 3   Goal Identifier Mutli-step directions   Goal Description Patient will be able to follow 4 step directions for improve cognitive ability demonstrating progress and ability to safely participate in IADL such as work and driving to her highest potential.     Target Date 19   OT Goal 4   Goal Identifier Divided attention   Goal Description Patient will complete 3 # sequence and 60+ light hits as tested by [employer] with no increase in symptoms (dizziness, headache, etc), in order to improve skills needed (peripheral vision, divided attention, and reaction speed) for safety with IADL, driving, and work task.    Target Date 19         Progress Toward Goals:   Progress limited due to only treated for 2 therapy sessions.  Patient had OT appointment on 19 she did not show to and today 8/15/19 9:00.  Left message to call back. She has been waiting for approval from work comp and was canceling  appointments previous weeks.    Plan:  Discharge from therapy.     Discharge:    Reason for Discharge: Patient chooses to discontinue therapy.  Patient has failed to schedule further appointments.      Discharge Plan: Patient to continue home program.  Patient may benefit from continued therapy services.  Please place new order if appropriate.     Thank you for your referral.     FENG Gutierrez [facility]  Occupational Therapy     Phone: [phone]  Email: [email or web address]

## 2020-03-23 NOTE — ADDENDUM NOTE
Encounter addended by: Jo Spaulding OT on: 3/23/2020 9:19 AM   Actions taken: Pend clinical note, Flowsheet accepted, Clinical Note Signed, Episode resolved

## 2020-11-29 ENCOUNTER — HEALTH MAINTENANCE LETTER (OUTPATIENT)
Age: 25
End: 2020-11-29

## 2021-09-19 ENCOUNTER — HEALTH MAINTENANCE LETTER (OUTPATIENT)
Age: 26
End: 2021-09-19

## 2022-01-09 ENCOUNTER — HEALTH MAINTENANCE LETTER (OUTPATIENT)
Age: 27
End: 2022-01-09

## 2022-11-21 ENCOUNTER — HEALTH MAINTENANCE LETTER (OUTPATIENT)
Age: 27
End: 2022-11-21

## 2023-04-16 ENCOUNTER — HEALTH MAINTENANCE LETTER (OUTPATIENT)
Age: 28
End: 2023-04-16